# Patient Record
Sex: FEMALE | Race: WHITE | Employment: OTHER | URBAN - METROPOLITAN AREA
[De-identification: names, ages, dates, MRNs, and addresses within clinical notes are randomized per-mention and may not be internally consistent; named-entity substitution may affect disease eponyms.]

---

## 2017-01-10 ENCOUNTER — OFFICE VISIT (OUTPATIENT)
Dept: CARDIOLOGY CLINIC | Age: 47
End: 2017-01-10

## 2017-01-10 VITALS
WEIGHT: 122.6 LBS | HEART RATE: 76 BPM | SYSTOLIC BLOOD PRESSURE: 100 MMHG | BODY MASS INDEX: 21.72 KG/M2 | HEIGHT: 63 IN | DIASTOLIC BLOOD PRESSURE: 66 MMHG

## 2017-01-10 DIAGNOSIS — R06.02 SOB (SHORTNESS OF BREATH): Primary | ICD-10-CM

## 2017-01-10 DIAGNOSIS — R00.2 PALPITATIONS: ICD-10-CM

## 2017-01-10 DIAGNOSIS — M34.9 SCLERODERMA (HCC): ICD-10-CM

## 2017-01-10 PROCEDURE — 99214 OFFICE O/P EST MOD 30 MIN: CPT | Performed by: INTERNAL MEDICINE

## 2017-01-10 RX ORDER — MULTIVIT-MIN/IRON/FOLIC ACID/K 18-600-40
1 CAPSULE ORAL DAILY
Qty: 30 CAPSULE | Refills: 0 | Status: SHIPPED | OUTPATIENT
Start: 2017-01-10 | End: 2017-03-24 | Stop reason: SDUPTHER

## 2017-01-23 ENCOUNTER — TELEPHONE (OUTPATIENT)
Dept: CARDIOLOGY CLINIC | Age: 47
End: 2017-01-23

## 2017-01-30 PROBLEM — Z79.899 HIGH RISK MEDICATION USE: Status: ACTIVE | Noted: 2017-01-30

## 2017-01-31 ENCOUNTER — OFFICE VISIT (OUTPATIENT)
Dept: RHEUMATOLOGY | Age: 47
End: 2017-01-31

## 2017-01-31 ENCOUNTER — OFFICE VISIT (OUTPATIENT)
Dept: CARDIOLOGY CLINIC | Age: 47
End: 2017-01-31

## 2017-01-31 VITALS
HEART RATE: 68 BPM | SYSTOLIC BLOOD PRESSURE: 110 MMHG | WEIGHT: 121.4 LBS | BODY MASS INDEX: 21.51 KG/M2 | DIASTOLIC BLOOD PRESSURE: 60 MMHG | HEIGHT: 63 IN

## 2017-01-31 VITALS
WEIGHT: 120 LBS | DIASTOLIC BLOOD PRESSURE: 74 MMHG | BODY MASS INDEX: 21.26 KG/M2 | HEART RATE: 80 BPM | SYSTOLIC BLOOD PRESSURE: 100 MMHG

## 2017-01-31 DIAGNOSIS — I47.1 SVT (SUPRAVENTRICULAR TACHYCARDIA) (HCC): ICD-10-CM

## 2017-01-31 DIAGNOSIS — I27.20 PULMONARY HYPERTENSION (HCC): ICD-10-CM

## 2017-01-31 DIAGNOSIS — R21 RASH: ICD-10-CM

## 2017-01-31 DIAGNOSIS — R00.2 PALPITATIONS: Primary | ICD-10-CM

## 2017-01-31 DIAGNOSIS — M35.00 SICCA SYNDROME (HCC): ICD-10-CM

## 2017-01-31 DIAGNOSIS — Z79.899 HIGH RISK MEDICATION USE: ICD-10-CM

## 2017-01-31 DIAGNOSIS — M34.9 SCLERODERMA (HCC): Primary | ICD-10-CM

## 2017-01-31 PROBLEM — I47.10 SVT (SUPRAVENTRICULAR TACHYCARDIA): Status: ACTIVE | Noted: 2017-01-31

## 2017-01-31 LAB
A/G RATIO: 1.7 (ref 1.1–2.2)
ALBUMIN SERPL-MCNC: 4.5 G/DL (ref 3.4–5)
ALP BLD-CCNC: 39 U/L (ref 40–129)
ALT SERPL-CCNC: 17 U/L (ref 10–40)
ANION GAP SERPL CALCULATED.3IONS-SCNC: 13 MMOL/L (ref 3–16)
AST SERPL-CCNC: 21 U/L (ref 15–37)
BACTERIA: ABNORMAL /HPF
BASOPHILS ABSOLUTE: 0.1 K/UL (ref 0–0.2)
BASOPHILS RELATIVE PERCENT: 0.9 %
BILIRUB SERPL-MCNC: 0.4 MG/DL (ref 0–1)
BILIRUBIN URINE: ABNORMAL
BLOOD, URINE: NEGATIVE
BUN BLDV-MCNC: 12 MG/DL (ref 7–20)
CALCIUM SERPL-MCNC: 9.4 MG/DL (ref 8.3–10.6)
CHLORIDE BLD-SCNC: 103 MMOL/L (ref 99–110)
CLARITY: CLEAR
CO2: 22 MMOL/L (ref 21–32)
COLOR: ABNORMAL
CREAT SERPL-MCNC: 0.6 MG/DL (ref 0.6–1.1)
EOSINOPHILS ABSOLUTE: 0.2 K/UL (ref 0–0.6)
EOSINOPHILS RELATIVE PERCENT: 3.1 %
EPITHELIAL CELLS, UA: ABNORMAL /HPF
GFR AFRICAN AMERICAN: >60
GFR NON-AFRICAN AMERICAN: >60
GLOBULIN: 2.7 G/DL
GLUCOSE BLD-MCNC: 83 MG/DL (ref 70–99)
GLUCOSE URINE: NEGATIVE MG/DL
HCT VFR BLD CALC: 40 % (ref 36–48)
HEMOGLOBIN: 13.1 G/DL (ref 12–16)
KETONES, URINE: ABNORMAL MG/DL
LEUKOCYTE ESTERASE, URINE: NEGATIVE
LYMPHOCYTES ABSOLUTE: 2 K/UL (ref 1–5.1)
LYMPHOCYTES RELATIVE PERCENT: 28.1 %
MCH RBC QN AUTO: 29.8 PG (ref 26–34)
MCHC RBC AUTO-ENTMCNC: 32.7 G/DL (ref 31–36)
MCV RBC AUTO: 91.2 FL (ref 80–100)
MICROSCOPIC EXAMINATION: YES
MONOCYTES ABSOLUTE: 0.4 K/UL (ref 0–1.3)
MONOCYTES RELATIVE PERCENT: 5.6 %
MUCUS: ABNORMAL /LPF
NEUTROPHILS ABSOLUTE: 4.4 K/UL (ref 1.7–7.7)
NEUTROPHILS RELATIVE PERCENT: 62.3 %
NITRITE, URINE: NEGATIVE
PDW BLD-RTO: 14.1 % (ref 12.4–15.4)
PH UA: 5.5
PLATELET # BLD: 183 K/UL (ref 135–450)
PMV BLD AUTO: 12.4 FL (ref 5–10.5)
POTASSIUM SERPL-SCNC: 4.2 MMOL/L (ref 3.5–5.1)
PROTEIN UA: ABNORMAL MG/DL
RBC # BLD: 4.38 M/UL (ref 4–5.2)
RBC UA: ABNORMAL /HPF (ref 0–2)
SODIUM BLD-SCNC: 138 MMOL/L (ref 136–145)
SPECIFIC GRAVITY UA: 1.03
TOTAL PROTEIN: 7.2 G/DL (ref 6.4–8.2)
URINE TYPE: ABNORMAL
UROBILINOGEN, URINE: 0.2 E.U./DL
WBC # BLD: 7 K/UL (ref 4–11)
WBC UA: ABNORMAL /HPF (ref 0–5)

## 2017-01-31 PROCEDURE — G8427 DOCREV CUR MEDS BY ELIG CLIN: HCPCS | Performed by: INTERNAL MEDICINE

## 2017-01-31 PROCEDURE — 99214 OFFICE O/P EST MOD 30 MIN: CPT | Performed by: INTERNAL MEDICINE

## 2017-01-31 PROCEDURE — G8484 FLU IMMUNIZE NO ADMIN: HCPCS | Performed by: INTERNAL MEDICINE

## 2017-01-31 PROCEDURE — 1036F TOBACCO NON-USER: CPT | Performed by: INTERNAL MEDICINE

## 2017-01-31 PROCEDURE — G8420 CALC BMI NORM PARAMETERS: HCPCS | Performed by: INTERNAL MEDICINE

## 2017-01-31 PROCEDURE — 93000 ELECTROCARDIOGRAM COMPLETE: CPT | Performed by: INTERNAL MEDICINE

## 2017-01-31 RX ORDER — HYDROXYZINE HYDROCHLORIDE 25 MG/1
TABLET, FILM COATED ORAL
Qty: 30 TABLET | Refills: 1 | Status: SHIPPED | OUTPATIENT
Start: 2017-01-31 | End: 2017-03-24

## 2017-02-01 ENCOUNTER — TELEPHONE (OUTPATIENT)
Dept: DERMATOLOGY | Age: 47
End: 2017-02-01

## 2017-02-02 ENCOUNTER — OFFICE VISIT (OUTPATIENT)
Dept: DERMATOLOGY | Age: 47
End: 2017-02-02

## 2017-02-02 DIAGNOSIS — D48.5 NEOPLASM OF UNCERTAIN BEHAVIOR OF SKIN: ICD-10-CM

## 2017-02-02 DIAGNOSIS — L30.9 DERMATITIS: Primary | ICD-10-CM

## 2017-02-02 PROCEDURE — 99214 OFFICE O/P EST MOD 30 MIN: CPT | Performed by: DERMATOLOGY

## 2017-02-02 PROCEDURE — 11100 PR BIOPSY OF SKIN LESION: CPT | Performed by: DERMATOLOGY

## 2017-02-02 PROCEDURE — G8427 DOCREV CUR MEDS BY ELIG CLIN: HCPCS | Performed by: DERMATOLOGY

## 2017-02-02 PROCEDURE — 1036F TOBACCO NON-USER: CPT | Performed by: DERMATOLOGY

## 2017-02-02 PROCEDURE — G8420 CALC BMI NORM PARAMETERS: HCPCS | Performed by: DERMATOLOGY

## 2017-02-02 PROCEDURE — G8484 FLU IMMUNIZE NO ADMIN: HCPCS | Performed by: DERMATOLOGY

## 2017-02-02 RX ORDER — FLUOCINONIDE 0.5 MG/G
OINTMENT TOPICAL
Qty: 60 G | Refills: 0 | Status: SHIPPED | OUTPATIENT
Start: 2017-02-02 | End: 2018-08-09 | Stop reason: SDUPTHER

## 2017-02-07 ENCOUNTER — TELEPHONE (OUTPATIENT)
Dept: DERMATOLOGY | Age: 47
End: 2017-02-07

## 2017-03-16 ENCOUNTER — TELEPHONE (OUTPATIENT)
Dept: CARDIOLOGY CLINIC | Age: 47
End: 2017-03-16

## 2017-03-24 ENCOUNTER — OFFICE VISIT (OUTPATIENT)
Dept: CARDIOLOGY CLINIC | Age: 47
End: 2017-03-24

## 2017-03-24 VITALS
DIASTOLIC BLOOD PRESSURE: 78 MMHG | WEIGHT: 118 LBS | SYSTOLIC BLOOD PRESSURE: 118 MMHG | BODY MASS INDEX: 20.9 KG/M2 | HEART RATE: 64 BPM

## 2017-03-24 DIAGNOSIS — I47.1 SVT (SUPRAVENTRICULAR TACHYCARDIA) (HCC): Primary | ICD-10-CM

## 2017-03-24 DIAGNOSIS — I27.21 PAH (PULMONARY ARTERY HYPERTENSION) (HCC): ICD-10-CM

## 2017-03-24 DIAGNOSIS — I47.1 AVNRT (AV NODAL RE-ENTRY TACHYCARDIA) (HCC): ICD-10-CM

## 2017-03-24 PROCEDURE — 1036F TOBACCO NON-USER: CPT | Performed by: NURSE PRACTITIONER

## 2017-03-24 PROCEDURE — 93000 ELECTROCARDIOGRAM COMPLETE: CPT | Performed by: NURSE PRACTITIONER

## 2017-03-24 PROCEDURE — G8427 DOCREV CUR MEDS BY ELIG CLIN: HCPCS | Performed by: NURSE PRACTITIONER

## 2017-03-24 PROCEDURE — G8420 CALC BMI NORM PARAMETERS: HCPCS | Performed by: NURSE PRACTITIONER

## 2017-03-24 PROCEDURE — 99213 OFFICE O/P EST LOW 20 MIN: CPT | Performed by: NURSE PRACTITIONER

## 2017-03-24 PROCEDURE — G8484 FLU IMMUNIZE NO ADMIN: HCPCS | Performed by: NURSE PRACTITIONER

## 2017-04-10 PROCEDURE — 93224 XTRNL ECG REC UP TO 48 HRS: CPT | Performed by: INTERNAL MEDICINE

## 2017-04-11 ENCOUNTER — OFFICE VISIT (OUTPATIENT)
Dept: CARDIOLOGY CLINIC | Age: 47
End: 2017-04-11

## 2017-04-11 ENCOUNTER — HOSPITAL ENCOUNTER (OUTPATIENT)
Dept: CARDIOLOGY | Facility: CLINIC | Age: 47
Discharge: HOME | End: 2017-04-11
Attending: INTERNAL MEDICINE | Admitting: INTERNAL MEDICINE

## 2017-04-11 VITALS
HEART RATE: 72 BPM | WEIGHT: 115.4 LBS | SYSTOLIC BLOOD PRESSURE: 116 MMHG | BODY MASS INDEX: 20.45 KG/M2 | DIASTOLIC BLOOD PRESSURE: 60 MMHG | HEIGHT: 63 IN

## 2017-04-11 DIAGNOSIS — R06.02 SOB (SHORTNESS OF BREATH): ICD-10-CM

## 2017-04-11 DIAGNOSIS — Z01.810 PREOP CARDIOVASCULAR EXAM: ICD-10-CM

## 2017-04-11 DIAGNOSIS — I27.21 PAH (PULMONARY ARTERY HYPERTENSION) (HCC): Primary | ICD-10-CM

## 2017-04-11 DIAGNOSIS — R00.2 PALPITATIONS: ICD-10-CM

## 2017-04-11 LAB
LV EF: 55 %
LVEF MODALITY: NORMAL

## 2017-04-11 PROCEDURE — 99214 OFFICE O/P EST MOD 30 MIN: CPT | Performed by: INTERNAL MEDICINE

## 2017-04-11 PROCEDURE — G8427 DOCREV CUR MEDS BY ELIG CLIN: HCPCS | Performed by: INTERNAL MEDICINE

## 2017-04-11 PROCEDURE — 1036F TOBACCO NON-USER: CPT | Performed by: INTERNAL MEDICINE

## 2017-04-11 PROCEDURE — G8420 CALC BMI NORM PARAMETERS: HCPCS | Performed by: INTERNAL MEDICINE

## 2017-04-25 ENCOUNTER — TELEPHONE (OUTPATIENT)
Dept: CARDIOLOGY CLINIC | Age: 47
End: 2017-04-25

## 2017-04-27 ENCOUNTER — HOSPITAL ENCOUNTER (OUTPATIENT)
Dept: OTHER | Age: 47
Discharge: HOME | End: 2017-04-27
Attending: INTERNAL MEDICINE | Admitting: INTERNAL MEDICINE

## 2017-04-28 ENCOUNTER — OFFICE VISIT (OUTPATIENT)
Dept: ENDOCRINOLOGY | Age: 47
End: 2017-04-28

## 2017-04-28 VITALS
HEIGHT: 63 IN | DIASTOLIC BLOOD PRESSURE: 70 MMHG | SYSTOLIC BLOOD PRESSURE: 126 MMHG | WEIGHT: 112.2 LBS | HEART RATE: 81 BPM | BODY MASS INDEX: 19.88 KG/M2 | OXYGEN SATURATION: 98 %

## 2017-04-28 DIAGNOSIS — E24.2 IATROGENIC CUSHINGOID FEATURES (HCC): ICD-10-CM

## 2017-04-28 DIAGNOSIS — E55.9 VITAMIN D DEFICIENCY: ICD-10-CM

## 2017-04-28 DIAGNOSIS — E03.9 ACQUIRED HYPOTHYROIDISM: Primary | ICD-10-CM

## 2017-04-28 DIAGNOSIS — G62.9 NEUROPATHY: ICD-10-CM

## 2017-04-28 DIAGNOSIS — E11.40 TYPE 2 DIABETES MELLITUS WITH DIABETIC NEUROPATHY, WITHOUT LONG-TERM CURRENT USE OF INSULIN (HCC): ICD-10-CM

## 2017-04-28 LAB
A/G RATIO: 1.4 (ref 1.1–2.2)
ALBUMIN SERPL-MCNC: 4.4 G/DL (ref 3.4–5)
ALP BLD-CCNC: 44 U/L (ref 40–129)
ALT SERPL-CCNC: 29 U/L (ref 10–40)
ANION GAP SERPL CALCULATED.3IONS-SCNC: 14 MMOL/L (ref 3–16)
ANTI-THYROGLOB ABS: 129 IU/ML
AST SERPL-CCNC: 17 U/L (ref 15–37)
BILIRUB SERPL-MCNC: 0.5 MG/DL (ref 0–1)
BUN BLDV-MCNC: 11 MG/DL (ref 7–20)
CALCIUM SERPL-MCNC: 9.2 MG/DL (ref 8.3–10.6)
CHLORIDE BLD-SCNC: 101 MMOL/L (ref 99–110)
CO2: 23 MMOL/L (ref 21–32)
CREAT SERPL-MCNC: 0.5 MG/DL (ref 0.6–1.1)
ESTIMATED AVERAGE GLUCOSE: 105.4 MG/DL
GFR AFRICAN AMERICAN: >60
GFR NON-AFRICAN AMERICAN: >60
GLOBULIN: 3.1 G/DL
GLUCOSE BLD-MCNC: 92 MG/DL (ref 70–99)
HBA1C MFR BLD: 5.3 %
POTASSIUM SERPL-SCNC: 4.4 MMOL/L (ref 3.5–5.1)
SODIUM BLD-SCNC: 138 MMOL/L (ref 136–145)
THYROID PEROXIDASE (TPO) ABS: 422 IU/ML
TOTAL PROTEIN: 7.5 G/DL (ref 6.4–8.2)
TSH SERPL DL<=0.05 MIU/L-ACNC: 1.13 UIU/ML (ref 0.27–4.2)
VITAMIN D 25-HYDROXY: 29.4 NG/ML

## 2017-04-28 PROCEDURE — 3044F HG A1C LEVEL LT 7.0%: CPT | Performed by: INTERNAL MEDICINE

## 2017-04-28 PROCEDURE — 1036F TOBACCO NON-USER: CPT | Performed by: INTERNAL MEDICINE

## 2017-04-28 PROCEDURE — G8427 DOCREV CUR MEDS BY ELIG CLIN: HCPCS | Performed by: INTERNAL MEDICINE

## 2017-04-28 PROCEDURE — 99214 OFFICE O/P EST MOD 30 MIN: CPT | Performed by: INTERNAL MEDICINE

## 2017-04-28 PROCEDURE — G8420 CALC BMI NORM PARAMETERS: HCPCS | Performed by: INTERNAL MEDICINE

## 2017-04-28 RX ORDER — ERGOCALCIFEROL (VITAMIN D2) 1250 MCG
50000 CAPSULE ORAL WEEKLY
Qty: 12 CAPSULE | Refills: 1 | Status: SHIPPED | OUTPATIENT
Start: 2017-04-28 | End: 2017-07-24 | Stop reason: SDUPTHER

## 2017-04-28 ASSESSMENT — PATIENT HEALTH QUESTIONNAIRE - PHQ9
SUM OF ALL RESPONSES TO PHQ QUESTIONS 1-9: 0
1. LITTLE INTEREST OR PLEASURE IN DOING THINGS: 0
2. FEELING DOWN, DEPRESSED OR HOPELESS: 0
SUM OF ALL RESPONSES TO PHQ9 QUESTIONS 1 & 2: 0

## 2017-05-02 ENCOUNTER — OFFICE VISIT (OUTPATIENT)
Dept: RHEUMATOLOGY | Age: 47
End: 2017-05-02

## 2017-05-02 VITALS
BODY MASS INDEX: 20.19 KG/M2 | SYSTOLIC BLOOD PRESSURE: 100 MMHG | DIASTOLIC BLOOD PRESSURE: 80 MMHG | WEIGHT: 114 LBS | HEART RATE: 76 BPM

## 2017-05-02 DIAGNOSIS — G62.9 NEUROPATHY: ICD-10-CM

## 2017-05-02 DIAGNOSIS — I27.21 PAH (PULMONARY ARTERY HYPERTENSION) (HCC): ICD-10-CM

## 2017-05-02 DIAGNOSIS — M34.9 SCLERODERMA (HCC): Primary | ICD-10-CM

## 2017-05-02 DIAGNOSIS — M35.00 SICCA SYNDROME (HCC): ICD-10-CM

## 2017-05-02 DIAGNOSIS — Z79.899 HIGH RISK MEDICATION USE: ICD-10-CM

## 2017-05-02 PROCEDURE — G8427 DOCREV CUR MEDS BY ELIG CLIN: HCPCS | Performed by: INTERNAL MEDICINE

## 2017-05-02 PROCEDURE — 99214 OFFICE O/P EST MOD 30 MIN: CPT | Performed by: INTERNAL MEDICINE

## 2017-05-02 PROCEDURE — 1036F TOBACCO NON-USER: CPT | Performed by: INTERNAL MEDICINE

## 2017-05-02 PROCEDURE — G8420 CALC BMI NORM PARAMETERS: HCPCS | Performed by: INTERNAL MEDICINE

## 2017-05-02 RX ORDER — PREDNISONE 10 MG/1
TABLET ORAL
Qty: 21 TABLET | Refills: 0 | Status: SHIPPED | OUTPATIENT
Start: 2017-05-02 | End: 2017-05-12

## 2017-05-02 RX ORDER — AZATHIOPRINE 50 MG/1
TABLET ORAL
Qty: 30 TABLET | Refills: 2 | Status: SHIPPED | OUTPATIENT
Start: 2017-05-02 | End: 2017-11-07 | Stop reason: SDUPTHER

## 2017-05-09 ENCOUNTER — TELEPHONE (OUTPATIENT)
Dept: PULMONOLOGY | Age: 47
End: 2017-05-09

## 2017-05-23 ENCOUNTER — OFFICE VISIT (OUTPATIENT)
Dept: CARDIOLOGY CLINIC | Age: 47
End: 2017-05-23

## 2017-05-23 VITALS
BODY MASS INDEX: 20.2 KG/M2 | WEIGHT: 114 LBS | HEIGHT: 63 IN | HEART RATE: 70 BPM | SYSTOLIC BLOOD PRESSURE: 112 MMHG | DIASTOLIC BLOOD PRESSURE: 70 MMHG | OXYGEN SATURATION: 98 %

## 2017-05-23 DIAGNOSIS — I10 ESSENTIAL HYPERTENSION: ICD-10-CM

## 2017-05-23 DIAGNOSIS — I27.21 PAH (PULMONARY ARTERY HYPERTENSION) (HCC): ICD-10-CM

## 2017-05-23 DIAGNOSIS — I47.1 SVT (SUPRAVENTRICULAR TACHYCARDIA) (HCC): Primary | ICD-10-CM

## 2017-05-23 DIAGNOSIS — R00.2 PALPITATIONS: ICD-10-CM

## 2017-05-23 DIAGNOSIS — I47.1 AVNRT (AV NODAL RE-ENTRY TACHYCARDIA) (HCC): ICD-10-CM

## 2017-05-23 PROCEDURE — G8427 DOCREV CUR MEDS BY ELIG CLIN: HCPCS | Performed by: INTERNAL MEDICINE

## 2017-05-23 PROCEDURE — 99214 OFFICE O/P EST MOD 30 MIN: CPT | Performed by: INTERNAL MEDICINE

## 2017-05-23 PROCEDURE — 1036F TOBACCO NON-USER: CPT | Performed by: INTERNAL MEDICINE

## 2017-05-23 PROCEDURE — 93000 ELECTROCARDIOGRAM COMPLETE: CPT | Performed by: INTERNAL MEDICINE

## 2017-05-23 PROCEDURE — G8420 CALC BMI NORM PARAMETERS: HCPCS | Performed by: INTERNAL MEDICINE

## 2017-06-29 ENCOUNTER — PATIENT MESSAGE (OUTPATIENT)
Dept: ENDOCRINOLOGY | Age: 47
End: 2017-06-29

## 2017-07-11 ENCOUNTER — PATIENT MESSAGE (OUTPATIENT)
Dept: RHEUMATOLOGY | Age: 47
End: 2017-07-11

## 2017-07-11 DIAGNOSIS — M34.9 SCLERODERMA (HCC): Primary | ICD-10-CM

## 2017-07-11 DIAGNOSIS — M54.2 CERVICAL PAIN (NECK): ICD-10-CM

## 2017-07-19 ENCOUNTER — HOSPITAL ENCOUNTER (OUTPATIENT)
Dept: PHYSICAL THERAPY | Age: 47
Discharge: HOME | End: 2017-07-31

## 2017-07-19 ASSESSMENT — PAIN DESCRIPTION - FREQUENCY: FREQUENCY: CONTINUOUS

## 2017-07-19 ASSESSMENT — PAIN DESCRIPTION - DESCRIPTORS: DESCRIPTORS: ACHING

## 2017-07-19 ASSESSMENT — PAIN DESCRIPTION - ORIENTATION: ORIENTATION: RIGHT

## 2017-07-19 ASSESSMENT — PAIN SCALES - GENERAL: PAINLEVEL_OUTOF10: 7

## 2017-07-19 ASSESSMENT — PAIN DESCRIPTION - LOCATION: LOCATION: NECK

## 2017-07-19 ASSESSMENT — PAIN DESCRIPTION - PAIN TYPE: TYPE: ACUTE PAIN

## 2017-07-19 ASSESSMENT — PAIN DESCRIPTION - PROGRESSION: CLINICAL_PROGRESSION: GRADUALLY IMPROVING

## 2017-07-24 ENCOUNTER — HOSPITAL ENCOUNTER (OUTPATIENT)
Dept: PHYSICAL THERAPY | Age: 47
Discharge: HOME | End: 2017-07-24

## 2017-07-24 ENCOUNTER — OFFICE VISIT (OUTPATIENT)
Dept: DERMATOLOGY | Age: 47
End: 2017-07-24

## 2017-07-24 DIAGNOSIS — D48.5 NEOPLASM OF UNCERTAIN BEHAVIOR OF SKIN: ICD-10-CM

## 2017-07-24 DIAGNOSIS — D22.9 MULTIPLE BENIGN NEVI: Primary | ICD-10-CM

## 2017-07-24 DIAGNOSIS — Z85.828 HISTORY OF NONMELANOMA SKIN CANCER: ICD-10-CM

## 2017-07-24 DIAGNOSIS — L57.0 ACTINIC KERATOSIS: ICD-10-CM

## 2017-07-24 DIAGNOSIS — Z12.83 SCREENING EXAM FOR SKIN CANCER: ICD-10-CM

## 2017-07-24 PROCEDURE — 1036F TOBACCO NON-USER: CPT | Performed by: DERMATOLOGY

## 2017-07-24 PROCEDURE — G8420 CALC BMI NORM PARAMETERS: HCPCS | Performed by: DERMATOLOGY

## 2017-07-24 PROCEDURE — G8427 DOCREV CUR MEDS BY ELIG CLIN: HCPCS | Performed by: DERMATOLOGY

## 2017-07-24 PROCEDURE — 17000 DESTRUCT PREMALG LESION: CPT | Performed by: DERMATOLOGY

## 2017-07-24 PROCEDURE — 11100 PR BIOPSY OF SKIN LESION: CPT | Performed by: DERMATOLOGY

## 2017-07-24 PROCEDURE — 99213 OFFICE O/P EST LOW 20 MIN: CPT | Performed by: DERMATOLOGY

## 2017-07-28 ENCOUNTER — TELEPHONE (OUTPATIENT)
Dept: DERMATOLOGY | Age: 47
End: 2017-07-28

## 2017-07-28 ENCOUNTER — HOSPITAL ENCOUNTER (OUTPATIENT)
Dept: PHYSICAL THERAPY | Age: 47
Discharge: HOME | End: 2017-07-28

## 2017-07-31 ENCOUNTER — HOSPITAL ENCOUNTER (OUTPATIENT)
Dept: PHYSICAL THERAPY | Age: 47
Discharge: HOME | End: 2017-07-31

## 2017-08-04 ENCOUNTER — HOSPITAL ENCOUNTER (OUTPATIENT)
Dept: PHYSICAL THERAPY | Age: 47
Discharge: HOME | End: 2017-08-04

## 2017-08-07 ENCOUNTER — HOSPITAL ENCOUNTER (OUTPATIENT)
Dept: PHYSICAL THERAPY | Age: 47
Discharge: HOME | End: 2017-08-07

## 2017-08-11 ENCOUNTER — HOSPITAL ENCOUNTER (OUTPATIENT)
Dept: PHYSICAL THERAPY | Age: 47
Discharge: HOME | End: 2017-08-11

## 2017-08-21 ENCOUNTER — OFFICE VISIT (OUTPATIENT)
Dept: PULMONOLOGY | Age: 47
End: 2017-08-21

## 2017-08-21 VITALS
HEIGHT: 63 IN | SYSTOLIC BLOOD PRESSURE: 102 MMHG | HEART RATE: 64 BPM | RESPIRATION RATE: 16 BRPM | OXYGEN SATURATION: 98 % | DIASTOLIC BLOOD PRESSURE: 62 MMHG | BODY MASS INDEX: 20.66 KG/M2 | TEMPERATURE: 98.6 F | WEIGHT: 116.6 LBS

## 2017-08-21 DIAGNOSIS — M34.9 SCLERODERMA (HCC): Primary | ICD-10-CM

## 2017-08-21 DIAGNOSIS — R06.02 SOB (SHORTNESS OF BREATH): ICD-10-CM

## 2017-08-21 PROCEDURE — G8427 DOCREV CUR MEDS BY ELIG CLIN: HCPCS | Performed by: INTERNAL MEDICINE

## 2017-08-21 PROCEDURE — 1036F TOBACCO NON-USER: CPT | Performed by: INTERNAL MEDICINE

## 2017-08-21 PROCEDURE — G8420 CALC BMI NORM PARAMETERS: HCPCS | Performed by: INTERNAL MEDICINE

## 2017-08-21 PROCEDURE — 99213 OFFICE O/P EST LOW 20 MIN: CPT | Performed by: INTERNAL MEDICINE

## 2017-08-24 ENCOUNTER — HOSPITAL ENCOUNTER (OUTPATIENT)
Dept: PHYSICAL THERAPY | Age: 47
Discharge: HOME | End: 2017-08-24

## 2017-09-05 ENCOUNTER — TELEPHONE (OUTPATIENT)
Dept: INTERNAL MEDICINE CLINIC | Age: 47
End: 2017-09-05

## 2017-09-05 PROBLEM — M54.2 CERVICAL PAIN (NECK): Status: ACTIVE | Noted: 2017-09-05

## 2017-09-07 ENCOUNTER — OFFICE VISIT (OUTPATIENT)
Dept: RHEUMATOLOGY | Age: 47
End: 2017-09-07

## 2017-09-07 VITALS
DIASTOLIC BLOOD PRESSURE: 74 MMHG | SYSTOLIC BLOOD PRESSURE: 130 MMHG | BODY MASS INDEX: 19.91 KG/M2 | HEART RATE: 80 BPM | WEIGHT: 112.4 LBS

## 2017-09-07 DIAGNOSIS — I27.21 PAH (PULMONARY ARTERY HYPERTENSION) (HCC): ICD-10-CM

## 2017-09-07 DIAGNOSIS — M54.2 CERVICAL PAIN (NECK): ICD-10-CM

## 2017-09-07 DIAGNOSIS — M35.00 SICCA SYNDROME (HCC): ICD-10-CM

## 2017-09-07 DIAGNOSIS — R00.2 PALPITATIONS: ICD-10-CM

## 2017-09-07 DIAGNOSIS — M34.9 SCLERODERMA (HCC): Primary | ICD-10-CM

## 2017-09-07 DIAGNOSIS — Z79.899 HIGH RISK MEDICATION USE: ICD-10-CM

## 2017-09-07 LAB
ALBUMIN SERPL-MCNC: 5 G/DL (ref 3.4–5)
ALP BLD-CCNC: 44 U/L (ref 40–129)
ALT SERPL-CCNC: 42 U/L (ref 10–40)
AST SERPL-CCNC: 37 U/L (ref 15–37)
BASOPHILS ABSOLUTE: 0 K/UL (ref 0–0.2)
BASOPHILS RELATIVE PERCENT: 0.2 %
BILIRUB SERPL-MCNC: 0.4 MG/DL (ref 0–1)
BILIRUBIN DIRECT: <0.2 MG/DL (ref 0–0.3)
BILIRUBIN, INDIRECT: ABNORMAL MG/DL (ref 0–1)
C-REACTIVE PROTEIN: 0.8 MG/L (ref 0–5.1)
CREAT SERPL-MCNC: 0.6 MG/DL (ref 0.6–1.1)
EOSINOPHILS ABSOLUTE: 0.1 K/UL (ref 0–0.6)
EOSINOPHILS RELATIVE PERCENT: 0.4 %
GFR AFRICAN AMERICAN: >60
GFR NON-AFRICAN AMERICAN: >60
HCT VFR BLD CALC: 41.6 % (ref 36–48)
HEMOGLOBIN: 13.5 G/DL (ref 12–16)
LYMPHOCYTES ABSOLUTE: 1 K/UL (ref 1–5.1)
LYMPHOCYTES RELATIVE PERCENT: 7.3 %
MCH RBC QN AUTO: 29 PG (ref 26–34)
MCHC RBC AUTO-ENTMCNC: 32.6 G/DL (ref 31–36)
MCV RBC AUTO: 89 FL (ref 80–100)
MONOCYTES ABSOLUTE: 0.6 K/UL (ref 0–1.3)
MONOCYTES RELATIVE PERCENT: 4.2 %
NEUTROPHILS ABSOLUTE: 12 K/UL (ref 1.7–7.7)
NEUTROPHILS RELATIVE PERCENT: 87.9 %
PDW BLD-RTO: 15.3 % (ref 12.4–15.4)
PLATELET # BLD: 234 K/UL (ref 135–450)
PMV BLD AUTO: 12.7 FL (ref 5–10.5)
RBC # BLD: 4.67 M/UL (ref 4–5.2)
TOTAL PROTEIN: 8.3 G/DL (ref 6.4–8.2)
WBC # BLD: 13.6 K/UL (ref 4–11)

## 2017-09-07 PROCEDURE — G8427 DOCREV CUR MEDS BY ELIG CLIN: HCPCS | Performed by: INTERNAL MEDICINE

## 2017-09-07 PROCEDURE — 99214 OFFICE O/P EST MOD 30 MIN: CPT | Performed by: INTERNAL MEDICINE

## 2017-09-07 PROCEDURE — G8420 CALC BMI NORM PARAMETERS: HCPCS | Performed by: INTERNAL MEDICINE

## 2017-09-07 PROCEDURE — 1036F TOBACCO NON-USER: CPT | Performed by: INTERNAL MEDICINE

## 2017-09-07 RX ORDER — PREDNISONE 10 MG/1
TABLET ORAL
Qty: 21 TABLET | Refills: 0 | Status: SHIPPED | OUTPATIENT
Start: 2017-09-07 | End: 2017-09-17

## 2017-10-17 ENCOUNTER — TELEPHONE (OUTPATIENT)
Dept: ENDOCRINOLOGY | Age: 47
End: 2017-10-17

## 2017-10-23 ENCOUNTER — OFFICE VISIT (OUTPATIENT)
Dept: CARDIOLOGY CLINIC | Age: 47
End: 2017-10-23

## 2017-10-23 VITALS
DIASTOLIC BLOOD PRESSURE: 62 MMHG | HEIGHT: 63 IN | BODY MASS INDEX: 19.84 KG/M2 | SYSTOLIC BLOOD PRESSURE: 114 MMHG | HEART RATE: 68 BPM | WEIGHT: 112 LBS

## 2017-10-23 DIAGNOSIS — M34.9 SCLERODERMA (HCC): ICD-10-CM

## 2017-10-23 DIAGNOSIS — R00.2 PALPITATIONS: ICD-10-CM

## 2017-10-23 DIAGNOSIS — I27.21 PAH (PULMONARY ARTERY HYPERTENSION) (HCC): Primary | ICD-10-CM

## 2017-10-23 DIAGNOSIS — R06.02 SOB (SHORTNESS OF BREATH): ICD-10-CM

## 2017-10-23 PROCEDURE — G8484 FLU IMMUNIZE NO ADMIN: HCPCS | Performed by: INTERNAL MEDICINE

## 2017-10-23 PROCEDURE — G8427 DOCREV CUR MEDS BY ELIG CLIN: HCPCS | Performed by: INTERNAL MEDICINE

## 2017-10-23 PROCEDURE — 99214 OFFICE O/P EST MOD 30 MIN: CPT | Performed by: INTERNAL MEDICINE

## 2017-10-23 PROCEDURE — 1036F TOBACCO NON-USER: CPT | Performed by: INTERNAL MEDICINE

## 2017-10-23 PROCEDURE — G8420 CALC BMI NORM PARAMETERS: HCPCS | Performed by: INTERNAL MEDICINE

## 2017-10-23 NOTE — LETTER
[address] [place] [address] Cardiology - [address] [place] [address]  Phone: [phone]  Fax: [phone]    Prince Bean MD        October 26, 2017     Debbe Ponds  [place] [id number] [facility] [id number]    Patient: Grecia Pedersen  MR Number: [id number]  YOB: 1970  Date of Visit: 10/23/2017    Dear Dr. Jonathon Jones:    Thank you for the request for consultation for Vanessa Mir to me for the evaluation of PAH. Below are the relevant portions of my assessment and plan of care. Assessment:      Problem List Items Addressed This Visit      Scleroderma ([address]     Palpitations     SOB (shortness of breath)     PAH (pulmonary artery hypertension) - Primary       Other Visit Diagnoses    None.               Plan:    1. Continue current medications. 2. Will obtain chest xray to evaluate left side pain  3. Check blood work as ordered, CMP, BNP, lipids   4. Follow up with me in 6 months with echo  5. Could maybe try nifedipine for her cold fingers and toes    If you have questions, please do not hesitate to call me. I look forward to following Alice Anne along with you.     Sincerely,        Prince Bean MD

## 2017-10-23 NOTE — PROGRESS NOTES
[address]   Advanced Heart Failure/Pulmonary Hypertension  Cardiac Evaluation      [facility]  YOB: 1970    Date of Visit:  10/23/17      Chief Complaint   Patient presents with    6 Month Follow-Up    Chest Pain    Shortness of Breath        History of Present Illness: This 56 y.o. female is seen at the request of Dr. Brando Haywood for consultation of [address]. She was seen by Dr. Pablo Wilcox in the past for her [address] but wants to switch to me. She has a complex history of PAH, Scleroderma, Alpha 1 antitrypsin deficiency, Raynauds and arthritis. She had thyroid dysfunction and diabetes with her pregnancy. She also has cervical stenosis. She is a mother of 11 children youngest one is 3year old. She sees Dr. Lisa Feliciano yearly for scleroderma and gets yearly CT scans. She sees endocrinologist for thyroid problems and diabetes. Was having ptosis on her eye with double vision and was started on high dose prednisone. She notices a little shortness of breath if she starts to move faster and her heart rate starts to race that is getting progressively worse in frequency and severity. She wore a 24 hour monitor but no racing heart beats at the time she was wearing it. She usually wait it out rest and it resolves after several hours. She did call the [facility]. She was seen by EPO years ago and has tried a medication name unknown to slow the HR but her BP went too low and the medication was stopped. Family history includes GM with heart disease at age [de-identified]. Her other GM has a pacemaker. Her breathing has been okay. She underwent R & LHC on 6/1/2016 that revealed no CAD and no PHTN. She has an ablation scheduled on 8/8/2016 for the SVT and she was postponing until the end of summer due to inability to swim after procedure. She does report some occasional chest pain that she reports with palpation and some deep pain that is similar to pleurisy.       On 1/10/017 she reported she had increase SOB and left sided chest pain under her breast to her left axilla with exercise or over exertion. She reported the pain was sharp and worse with deep inspiration. She reported the sharp pain started around April 2016. She reported she has occasional palpitations which was ongoing. She also had occasional BLE edema which was unchanged. She reported the palpitations resolved with vagal maneuvers. She has been following with Dr Wyatt Portillo for scleroderma. She underwent an SVT ablation on 3/15/17. She wore a 48 hour holter monitor. Her preliminary echo from 4/11/17 shows normal function. She is here for follow up for PAH, SOB, and palpitations. Today she reports she has been feeling well. She states she has SOB when she exercises's  She has a pain on her left side around her rib area when she is active. This occurs when she takes a deep breath. She follows with DR. Wyatt Portillo. She still has palpitations at times that have improved. She denies any dizziness, edema or syncope. She states her fingers are cold all of the time.      Allergies   Allergen Reactions    Cymbalta [Duloxetine Hcl]      Swelling of face    Spectracef [Cefditoren Pivoxil] Shortness Of Breath, Itching and Swelling    Bactrim Rash    Cephalosporins     Other      TREE NUTS  APPLES    Proventil [Albuterol]     Robaxin [Methocarbamol]     Tramadol Itching     Mouth Itch    Ciprofloxacin Swelling and Rash    Dilaudid [Hydromorphone] Nausea And Vomiting     Current Outpatient Prescriptions   Medication Sig Dispense Refill    levothyroxine (SYNTHROID) 75 MCG tablet TAKE ONE TABLET BY MOUTH DAILY 30 tablet 1    azaTHIOprine (IMURAN) 50 MG tablet 1/2 pill once a day 30 tablet 2    ondansetron (ZOFRAN-ODT) 4 MG disintegrating tablet Take 4 mg by mouth every 8 hours as needed for Nausea or Vomiting      fluticasone (FLONASE) 50 MCG/ACT nasal spray 1 spray by Nasal route daily      fluocinonide (LIDEX) 0.05 % ointment Apply sparingly to affected itchy flared area(s) up to bid prn 60 g 0    hydroxychloroquine (PLAQUENIL) 200 MG tablet TAKE ONE TABLET BY MOUTH TWICE A DAY 60 tablet 11    pyridostigmine (MESTINON) 60 MG tablet Take 60 mg by mouth daily       Alpha Lipoic Acid 200 MG CAPS Take 200 mg by mouth 2 times daily 180 capsule 1    omeprazole (PRILOSEC) 20 MG capsule Take 20 mg by mouth daily      gabapentin (NEURONTIN) 100 MG capsule Take 300 mg by mouth 3 times daily       UNABLE TO FIND Bio med #78      lidocaine (LIDODERM) 5 % Place 1 patch onto the skin daily. 12 hours on, 12 hours off. 30 patch 0    diclofenac sodium 1 % GEL Apply 2 g topically 4 times daily as needed. 1 Tube 5    calcium carbonate-vitamin D (CALTRATE 600+D) 600-400 MG-UNIT TABS Take  by mouth.  aspirin 81 MG EC tablet Take 81 mg by mouth daily.  Vitamin D (CHOLECALCIFEROL) 1000 UNIT CAPS capsule Take 2,000 Units by mouth daily        No current facility-administered medications for this visit. Past Medical History:   Diagnosis Date    Alpha-1-antitrypsin deficiency ([facility] [address] [facility]     Blood circulation, collateral rynaud's disease    Cardiac arrhythmia     Clostridium difficile diarrhea     DNA probe positive 8/7/11    Colitis 7/2011    Salmonela positive    Diarrhea     Fibromyalgia     GERD (gastroesophageal reflux disease)     irritable bowel    Hashimoto thyroiditis     History of recurrent miscarriages     3    Incompetent cervix     3 cervical procedures.  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 [address] [facility] 2008    Thyroid dysfunction in pregnancy     Thyroiditis     Postpartum, resolved    Type II diabetes mellitus with neurological manifestations [facility])      Past Surgical History:   Procedure Laterality Date    ABLATION OF DYSRHYTHMIC FOCUS  03/2017    COLONOSCOPY  7/29/2011    CYSTOSCOPY  7/2011    DIAGNOSTIC CARDIAC CATH LAB PROCEDURE      DILATION AND CURETTAGE OF UTERUS      MOHS SURGERY Right 10/2015    VASC UPPER EXTREMITY VENOUS  UNI  01/2017    Left lower Leg    VENTRICULAR ABLATION SURGERY      VESICOVAGINAL FISTULA REPAIR  04/2017     Family History   Problem Relation Age of Onset    Cancer Maternal Grandfather      colon cancer    Thyroid Disease Mother     Diabetes Maternal Grandmother     Heart Disease Maternal Grandmother     Thyroid Disease Maternal Grandmother     Cancer Maternal Grandmother      melanoma    Other Daughter      connective tissues disease    Cancer Maternal Uncle      melanoma    High Cholesterol Neg Hx     High Blood Pressure Neg Hx      Social History     Social History    Marital status:      Spouse name: N/A    Number of children: N/A    Years of education: N/A     Occupational History    Not on file. Social History Main Topics    Smoking status: Never Smoker    Smokeless tobacco: Never Used    Alcohol use No    Drug use: No    Sexual activity: Not on file     Other Topics Concern    Not on file     Social History Narrative    No narrative on file       Review of Systems:   · Constitutional: there has been no unanticipated weight loss. There's been no change in energy level, sleep pattern, or activity level. · Eyes: No visual changes or diplopia. No scleral icterus. · ENT: No Headaches, hearing loss or vertigo. No mouth sores or sore throat. · Cardiovascular: Reviewed in HPI  · Respiratory: No cough or wheezing, no sputum production. No hematemesis. · Gastrointestinal: No abdominal pain, appetite loss, blood in stools. No change in bowel or bladder habits. · Genitourinary: No dysuria, trouble voiding, or hematuria.   · Musculoskeletal:  No gait disturbance, weakness or joint complaints. · Integumentary: No rash or pruritis. · Neurological: No headache, diplopia, change in muscle strength, numbness or tingling. No change in gait, balance, coordination, mood, affect, memory, mentation, behavior. · Psychiatric: No anxiety, no depression. · Endocrine: No malaise, fatigue or temperature intolerance. No excessive thirst, fluid intake, or urination. No tremor. · Hematologic/Lymphatic: No abnormal bruising or bleeding, blood clots or swollen lymph nodes. · Allergic/Immunologic: No nasal congestion or hives. Physical Examination:    Vitals:    10/23/17 0732   BP: 114/62   Pulse: 68   Weight: 112 lb (50.8 kg)   Height: 5' 3\" (1.6 m)     Body mass index is 19.84 kg/m². Wt Readings from Last 3 Encounters:   10/23/17 112 lb (50.8 kg)   09/07/17 112 lb 6.4 oz (51 kg)   08/21/17 116 lb 9.6 oz (52.9 kg)     BP Readings from Last 3 Encounters:   10/23/17 114/62   09/07/17 130/74   08/21/17 102/62     Constitutional and General Appearance:   WD/WN in NAD  HEENT:  NC/AT  Skin:  Warm, dry  Respiratory:  · Normal excursion and expansion without use of accessory muscles  · Resp Auscultation: Normal breath sounds without dullness  Cardiovascular:  · The apical impulses not displaced  · Heart tones are crisp and normal  · Cervical veins are not engorged  · The carotid upstroke is normal in amplitude and contour without delay or bruit  · JVP less than 8 cm H2O  RRR with nl S1 and S2 without m,r,g  · Peripheral pulses are symmetrical and full  · There is no clubbing, cyanosis of the extremities.   · No edema  · Femoral Arteries: 2+ and equal  · Pedal Pulses: 2+ and equal   Neck:  · JVP less than 8 cm H2O  · No thyromegaly  Abdomen:  · No masses or tenderness  · Liver/Spleen: No Abnormalities Noted  Neurological/Psychiatric:  · Alert and oriented in all spheres  · Moves all extremities well  · Exhibits normal gait balance and coordination  · No abnormalities of mood, affect, memory, mentation, or behavior are noted      Echo 4/11/17  Summary   Normal left ventricle systolic function with an estimated ejection fraction   of 55%. No regional wall motion abnormalities seen. Normal left ventricular diastolic filling pressure. Systolic pulmonary artery pressure (SPAP) is normal and estimated at 24 mmHg   (RA pressure 3 mmHg). CATH: 6/1/2016  PA 18/6 TZ-by echo RVSP 47-4/16  ~Findings  Normal pulmonary pressures and outputs  Normal coronary arteries   Recommendation  ~Aggressive medical treatment and risk factor modification      GXT Myoview-4/19/2016  Normal LV size and systolic function. Left ventricular ejection fraction of   70 %. Normal wall motion. There is a small anterolateral defect that is   present at rest and worse at stress concerning for ischemia. However cannot   rule out artifact from breast attenuation and increased bowel uptake of   radio tracer. ECHO: 4/19/2016  Normal left ventricle size,wall thickness and systolic function with an  estimated ejection fraction of 55-60%. No regional wall abnormalites are seen. Normal diastolic filling pattern for age. Mitral valve is structurally normal. No mitral stenosis. Mild Mitral regurgitation. The aortic valve is structurally normal.No aortic stenosis. Trace aortic regurgitation. Tricuspid valve is structurally normal.  Mild tricuspid regurgitation. Systolic pulmonary artery pressure (SPAP) is estimated at 47 mmHg consistent  with mild pulmonary hypertension (RA pressure 8 mmHg). IVC is normal in size (< 2.1 cm) and collapses < 50% with respiration  consistent with elevated RA pressure (8 mmHg). no intracardiac mass, vegetations or thrombi. Assessment:  Problem List Items Addressed This Visit     Scleroderma ([address]    Palpitations    SOB (shortness of breath)    PAH (pulmonary artery hypertension) - Primary      Other Visit Diagnoses    None. Plan:    1. Continue current medications.    2. Will obtain chest xray to evaluate left side pain  3. Check blood work as ordered, CMP, BNP, lipids   4. Follow up with me in 6 months with echo  5. Could maybe try nifedipine for her cold fingers and toes      QUALITY MEASURES  1. Tobacco Cessation Counseling: NA  2. Retake of BP if >140/90:   NA  3. Documentation to PCP/referring for new patient:  Sent to PCP at close of office visit  4. CAD patient on anti-platelet: NA  5. CAD patient on STATIN therapy:  NA  6. Patient with CHF and aFib on anticoagulation:  NA       I appreciate the opportunity of cooperating in the care of this patient.     John Evangelista M.D., [facility] [place]

## 2017-10-27 ENCOUNTER — HOSPITAL ENCOUNTER (OUTPATIENT)
Dept: OTHER | Age: 47
Discharge: HOME | End: 2017-10-27
Attending: INTERNAL MEDICINE | Admitting: INTERNAL MEDICINE

## 2017-10-27 DIAGNOSIS — R06.02 SOB (SHORTNESS OF BREATH): ICD-10-CM

## 2017-10-27 DIAGNOSIS — E24.2 IATROGENIC CUSHINGOID FEATURES (HCC): ICD-10-CM

## 2017-10-27 DIAGNOSIS — E03.9 ACQUIRED HYPOTHYROIDISM: ICD-10-CM

## 2017-10-27 DIAGNOSIS — G62.9 NEUROPATHY: ICD-10-CM

## 2017-10-27 DIAGNOSIS — E11.40 TYPE 2 DIABETES MELLITUS WITH DIABETIC NEUROPATHY, WITHOUT LONG-TERM CURRENT USE OF INSULIN (HCC): ICD-10-CM

## 2017-10-27 DIAGNOSIS — E55.9 VITAMIN D DEFICIENCY: ICD-10-CM

## 2017-10-27 DIAGNOSIS — I27.21 PAH (PULMONARY ARTERY HYPERTENSION) (HCC): ICD-10-CM

## 2017-10-27 LAB
A/G RATIO: 1.3 (ref 1.1–2.2)
ALBUMIN SERPL-MCNC: 4.4 G/DL (ref 3.4–5)
ALP BLD-CCNC: 42 U/L (ref 40–129)
ALT SERPL-CCNC: 18 U/L (ref 10–40)
ANION GAP SERPL CALCULATED.3IONS-SCNC: 15 MMOL/L (ref 3–16)
AST SERPL-CCNC: 22 U/L (ref 15–37)
BILIRUB SERPL-MCNC: 0.6 MG/DL (ref 0–1)
BUN BLDV-MCNC: 10 MG/DL (ref 7–20)
CALCIUM SERPL-MCNC: 9.3 MG/DL (ref 8.3–10.6)
CHLORIDE BLD-SCNC: 102 MMOL/L (ref 99–110)
CHOLESTEROL, TOTAL: 165 MG/DL (ref 0–199)
CO2: 23 MMOL/L (ref 21–32)
CREAT SERPL-MCNC: 0.6 MG/DL (ref 0.6–1.1)
ESTIMATED AVERAGE GLUCOSE: 116.9 MG/DL
GFR AFRICAN AMERICAN: >60
GFR NON-AFRICAN AMERICAN: >60
GLOBULIN: 3.3 G/DL
GLUCOSE BLD-MCNC: 82 MG/DL (ref 70–99)
HBA1C MFR BLD: 5.7 %
HDLC SERPL-MCNC: 71 MG/DL (ref 40–60)
LDL CHOLESTEROL CALCULATED: 86 MG/DL
POTASSIUM SERPL-SCNC: 4 MMOL/L (ref 3.5–5.1)
PRO-BNP: 45 PG/ML (ref 0–124)
SODIUM BLD-SCNC: 140 MMOL/L (ref 136–145)
T4 FREE: 1.4 NG/DL (ref 0.9–1.8)
TOTAL PROTEIN: 7.7 G/DL (ref 6.4–8.2)
TRIGL SERPL-MCNC: 40 MG/DL (ref 0–150)
TSH SERPL DL<=0.05 MIU/L-ACNC: 1.58 UIU/ML (ref 0.27–4.2)
VITAMIN D 25-HYDROXY: 31.9 NG/ML
VLDLC SERPL CALC-MCNC: 8 MG/DL

## 2017-11-06 ENCOUNTER — TELEPHONE (OUTPATIENT)
Dept: CARDIOLOGY CLINIC | Age: 47
End: 2017-11-06

## 2017-11-07 DIAGNOSIS — M34.9 SCLERODERMA (HCC): ICD-10-CM

## 2017-11-07 RX ORDER — AZATHIOPRINE 50 MG/1
TABLET ORAL
Qty: 15 TABLET | Refills: 2 | Status: SHIPPED | OUTPATIENT
Start: 2017-11-07 | End: 2018-02-04 | Stop reason: SDUPTHER

## 2017-12-15 RX ORDER — HYDROXYCHLOROQUINE SULFATE 200 MG/1
TABLET, FILM COATED ORAL
Qty: 60 TABLET | Refills: 10 | Status: SHIPPED | OUTPATIENT
Start: 2017-12-15 | End: 2018-12-23 | Stop reason: SDUPTHER

## 2018-02-04 DIAGNOSIS — M34.9 SCLERODERMA (HCC): ICD-10-CM

## 2018-02-05 RX ORDER — AZATHIOPRINE 50 MG/1
TABLET ORAL
Qty: 15 TABLET | Refills: 2 | Status: SHIPPED | OUTPATIENT
Start: 2018-02-05 | End: 2018-08-09

## 2018-04-10 ENCOUNTER — OFFICE VISIT (OUTPATIENT)
Dept: RHEUMATOLOGY | Age: 48
End: 2018-04-10

## 2018-04-10 VITALS
WEIGHT: 114 LBS | BODY MASS INDEX: 20.19 KG/M2 | HEART RATE: 70 BPM | DIASTOLIC BLOOD PRESSURE: 72 MMHG | SYSTOLIC BLOOD PRESSURE: 110 MMHG

## 2018-04-10 DIAGNOSIS — Z79.899 HIGH RISK MEDICATION USE: ICD-10-CM

## 2018-04-10 DIAGNOSIS — M35.00 SICCA SYNDROME (HCC): ICD-10-CM

## 2018-04-10 DIAGNOSIS — M34.9 SCLERODERMA (HCC): Primary | ICD-10-CM

## 2018-04-10 PROCEDURE — 93224 XTRNL ECG REC UP TO 48 HRS: CPT | Performed by: INTERNAL MEDICINE

## 2018-04-10 PROCEDURE — G8420 CALC BMI NORM PARAMETERS: HCPCS | Performed by: INTERNAL MEDICINE

## 2018-04-10 PROCEDURE — G8427 DOCREV CUR MEDS BY ELIG CLIN: HCPCS | Performed by: INTERNAL MEDICINE

## 2018-04-10 PROCEDURE — 1036F TOBACCO NON-USER: CPT | Performed by: INTERNAL MEDICINE

## 2018-04-10 PROCEDURE — 99214 OFFICE O/P EST MOD 30 MIN: CPT | Performed by: INTERNAL MEDICINE

## 2018-04-10 RX ORDER — PROMETHAZINE HYDROCHLORIDE 12.5 MG/1
TABLET ORAL
COMMUNITY
Start: 2018-01-29

## 2018-04-10 RX ORDER — CHOLECALCIFEROL (VITAMIN D3) 1250 MCG
1 CAPSULE ORAL
COMMUNITY
End: 2019-07-02

## 2018-04-11 ENCOUNTER — HOSPITAL ENCOUNTER (OUTPATIENT)
Dept: OTHER | Age: 48
Discharge: HOME | End: 2018-04-11
Attending: INTERNAL MEDICINE | Admitting: INTERNAL MEDICINE

## 2018-04-11 ENCOUNTER — TELEPHONE (OUTPATIENT)
Dept: CARDIOLOGY CLINIC | Age: 48
End: 2018-04-11

## 2018-04-11 DIAGNOSIS — R35.0 URINE FREQUENCY: Primary | ICD-10-CM

## 2018-04-11 LAB
ALBUMIN SERPL-MCNC: 4.6 G/DL (ref 3.4–5)
ALP BLD-CCNC: 41 U/L (ref 40–129)
ALT SERPL-CCNC: 25 U/L (ref 10–40)
AMYLASE: 81 U/L (ref 25–115)
AST SERPL-CCNC: 24 U/L (ref 15–37)
BACTERIA: ABNORMAL /HPF
BILIRUB SERPL-MCNC: 0.3 MG/DL (ref 0–1)
BILIRUBIN DIRECT: <0.2 MG/DL (ref 0–0.3)
BILIRUBIN URINE: NEGATIVE
BILIRUBIN, INDIRECT: NORMAL MG/DL (ref 0–1)
BLOOD, URINE: NEGATIVE
C-REACTIVE PROTEIN: 0.7 MG/L (ref 0–5.1)
CLARITY: ABNORMAL
COLOR: YELLOW
CREAT SERPL-MCNC: 0.5 MG/DL (ref 0.6–1.1)
EPITHELIAL CELLS, UA: ABNORMAL /HPF
GFR AFRICAN AMERICAN: >60
GFR NON-AFRICAN AMERICAN: >60
GLUCOSE URINE: NEGATIVE MG/DL
HCT VFR BLD CALC: 38.1 % (ref 36–48)
HEMOGLOBIN: 12.8 G/DL (ref 12–16)
KETONES, URINE: ABNORMAL MG/DL
LEUKOCYTE ESTERASE, URINE: ABNORMAL
LIPASE: 49 U/L (ref 13–60)
MCH RBC QN AUTO: 28.8 PG (ref 26–34)
MCHC RBC AUTO-ENTMCNC: 33.6 G/DL (ref 31–36)
MCV RBC AUTO: 85.9 FL (ref 80–100)
MICROSCOPIC EXAMINATION: YES
NITRITE, URINE: NEGATIVE
PDW BLD-RTO: 14.9 % (ref 12.4–15.4)
PH UA: 7
PLATELET # BLD: 191 K/UL (ref 135–450)
PMV BLD AUTO: 11.4 FL (ref 5–10.5)
PROTEIN UA: NEGATIVE MG/DL
RBC # BLD: 4.43 M/UL (ref 4–5.2)
RBC UA: ABNORMAL /HPF (ref 0–2)
SPECIFIC GRAVITY UA: 1.01
TOTAL PROTEIN: 7.7 G/DL (ref 6.4–8.2)
URINE TYPE: ABNORMAL
UROBILINOGEN, URINE: 0.2 E.U./DL
WBC # BLD: 8.3 K/UL (ref 4–11)
WBC UA: ABNORMAL /HPF (ref 0–5)

## 2018-04-12 LAB
ANA INTERPRETATION: NORMAL
ANTI-NUCLEAR ANTIBODY (ANA): NEGATIVE
URINE CULTURE, ROUTINE: NORMAL

## 2018-04-14 LAB — CENTROMERE AB IGG: 0 AU/ML (ref 0–40)

## 2018-04-17 ENCOUNTER — OFFICE VISIT (OUTPATIENT)
Dept: CARDIOLOGY CLINIC | Age: 48
End: 2018-04-17

## 2018-04-17 ENCOUNTER — HOSPITAL ENCOUNTER (OUTPATIENT)
Dept: OTHER | Age: 48
Discharge: HOME | End: 2018-04-17
Attending: INTERNAL MEDICINE | Admitting: INTERNAL MEDICINE

## 2018-04-17 ENCOUNTER — HOSPITAL ENCOUNTER (OUTPATIENT)
Dept: CARDIOLOGY | Facility: CLINIC | Age: 48
Discharge: HOME | End: 2018-04-17
Attending: INTERNAL MEDICINE | Admitting: INTERNAL MEDICINE

## 2018-04-17 VITALS
BODY MASS INDEX: 19.99 KG/M2 | OXYGEN SATURATION: 99 % | HEART RATE: 72 BPM | WEIGHT: 112.8 LBS | SYSTOLIC BLOOD PRESSURE: 116 MMHG | HEIGHT: 63 IN | DIASTOLIC BLOOD PRESSURE: 72 MMHG

## 2018-04-17 DIAGNOSIS — R06.02 SOB (SHORTNESS OF BREATH): Primary | ICD-10-CM

## 2018-04-17 DIAGNOSIS — R06.02 SOB (SHORTNESS OF BREATH): ICD-10-CM

## 2018-04-17 DIAGNOSIS — M34.9 SCLERODERMA (HCC): ICD-10-CM

## 2018-04-17 DIAGNOSIS — I47.1 SVT (SUPRAVENTRICULAR TACHYCARDIA) (HCC): ICD-10-CM

## 2018-04-17 DIAGNOSIS — R55 SYNCOPE, UNSPECIFIED SYNCOPE TYPE: ICD-10-CM

## 2018-04-17 DIAGNOSIS — R00.2 PALPITATIONS: ICD-10-CM

## 2018-04-17 DIAGNOSIS — Z79.899 HIGH RISK MEDICATION USE: ICD-10-CM

## 2018-04-17 LAB
A/G RATIO: 1.5 (ref 1.1–2.2)
ALBUMIN SERPL-MCNC: 4.9 G/DL (ref 3.4–5)
ALP BLD-CCNC: 39 U/L (ref 40–129)
ALT SERPL-CCNC: 17 U/L (ref 10–40)
ANION GAP SERPL CALCULATED.3IONS-SCNC: 22 MMOL/L (ref 3–16)
ANTI-THYROGLOB ABS: 84 IU/ML
AST SERPL-CCNC: 20 U/L (ref 15–37)
BASOPHILS ABSOLUTE: 0.1 K/UL (ref 0–0.2)
BASOPHILS RELATIVE PERCENT: 0.8 %
BILIRUB SERPL-MCNC: 0.5 MG/DL (ref 0–1)
BUN BLDV-MCNC: 11 MG/DL (ref 7–20)
CALCIUM SERPL-MCNC: 9.7 MG/DL (ref 8.3–10.6)
CHLORIDE BLD-SCNC: 97 MMOL/L (ref 99–110)
CHOLESTEROL, TOTAL: 196 MG/DL (ref 0–199)
CO2: 21 MMOL/L (ref 21–32)
CREAT SERPL-MCNC: 0.6 MG/DL (ref 0.6–1.1)
CREATININE URINE: 174.5 MG/DL (ref 28–259)
EOSINOPHILS ABSOLUTE: 0.2 K/UL (ref 0–0.6)
EOSINOPHILS RELATIVE PERCENT: 2.5 %
GFR AFRICAN AMERICAN: >60
GFR NON-AFRICAN AMERICAN: >60
GLOBULIN: 3.3 G/DL
GLUCOSE BLD-MCNC: 78 MG/DL (ref 70–99)
HCT VFR BLD CALC: 40 % (ref 36–48)
HDLC SERPL-MCNC: 81 MG/DL (ref 40–60)
HEMOGLOBIN: 13.3 G/DL (ref 12–16)
LDL CHOLESTEROL CALCULATED: 107 MG/DL
LV EF: 55 %
LVEF MODALITY: NORMAL
LYMPHOCYTES ABSOLUTE: 2.9 K/UL (ref 1–5.1)
LYMPHOCYTES RELATIVE PERCENT: 33.7 %
MAGNESIUM: 2.2 MG/DL (ref 1.8–2.4)
MCH RBC QN AUTO: 28.6 PG (ref 26–34)
MCHC RBC AUTO-ENTMCNC: 33.3 G/DL (ref 31–36)
MCV RBC AUTO: 85.9 FL (ref 80–100)
MICROALBUMIN UR-MCNC: <1.2 MG/DL
MICROALBUMIN/CREAT UR-RTO: NORMAL MG/G (ref 0–30)
MONOCYTES ABSOLUTE: 0.6 K/UL (ref 0–1.3)
MONOCYTES RELATIVE PERCENT: 6.7 %
NEUTROPHILS ABSOLUTE: 4.8 K/UL (ref 1.7–7.7)
NEUTROPHILS RELATIVE PERCENT: 56.3 %
PDW BLD-RTO: 15 % (ref 12.4–15.4)
PLATELET # BLD: 205 K/UL (ref 135–450)
PMV BLD AUTO: 12.2 FL (ref 5–10.5)
POTASSIUM SERPL-SCNC: 4 MMOL/L (ref 3.5–5.1)
PRO-BNP: 42 PG/ML (ref 0–124)
RBC # BLD: 4.65 M/UL (ref 4–5.2)
SODIUM BLD-SCNC: 140 MMOL/L (ref 136–145)
T3 FREE: 2.8 PG/ML (ref 2.3–4.2)
T4 FREE: 1.5 NG/DL (ref 0.9–1.8)
THYROID PEROXIDASE (TPO) ABS: 420 IU/ML
TOTAL PROTEIN: 8.2 G/DL (ref 6.4–8.2)
TRIGL SERPL-MCNC: 41 MG/DL (ref 0–150)
TSH SERPL DL<=0.05 MIU/L-ACNC: 1.91 UIU/ML (ref 0.27–4.2)
VITAMIN D 25-HYDROXY: 50.5 NG/ML
VLDLC SERPL CALC-MCNC: 8 MG/DL
WBC # BLD: 8.5 K/UL (ref 4–11)

## 2018-04-17 PROCEDURE — G8427 DOCREV CUR MEDS BY ELIG CLIN: HCPCS | Performed by: INTERNAL MEDICINE

## 2018-04-17 PROCEDURE — G8420 CALC BMI NORM PARAMETERS: HCPCS | Performed by: INTERNAL MEDICINE

## 2018-04-17 PROCEDURE — 1036F TOBACCO NON-USER: CPT | Performed by: INTERNAL MEDICINE

## 2018-04-17 PROCEDURE — 99215 OFFICE O/P EST HI 40 MIN: CPT | Performed by: INTERNAL MEDICINE

## 2018-04-18 ENCOUNTER — HOSPITAL ENCOUNTER (OUTPATIENT)
Dept: ULTRASOUND IMAGING | Age: 48
Discharge: HOME | End: 2018-04-18
Attending: INTERNAL MEDICINE | Admitting: INTERNAL MEDICINE

## 2018-04-18 DIAGNOSIS — E04.9 GOITER: ICD-10-CM

## 2018-04-18 DIAGNOSIS — E04.0 NONTOXIC DIFFUSE GOITER: ICD-10-CM

## 2018-04-18 DIAGNOSIS — I47.1 SVT (SUPRAVENTRICULAR TACHYCARDIA) (HCC): ICD-10-CM

## 2018-04-18 LAB
ESTIMATED AVERAGE GLUCOSE: 116.9 MG/DL
HBA1C MFR BLD: 5.7 %

## 2018-04-20 ENCOUNTER — TELEPHONE (OUTPATIENT)
Dept: CARDIOLOGY CLINIC | Age: 48
End: 2018-04-20

## 2018-05-29 ENCOUNTER — NURSE ONLY (OUTPATIENT)
Dept: CARDIOLOGY CLINIC | Age: 48
End: 2018-05-29

## 2018-05-29 ENCOUNTER — OFFICE VISIT (OUTPATIENT)
Dept: CARDIOLOGY CLINIC | Age: 48
End: 2018-05-29

## 2018-05-29 VITALS
SYSTOLIC BLOOD PRESSURE: 138 MMHG | WEIGHT: 118.12 LBS | DIASTOLIC BLOOD PRESSURE: 84 MMHG | HEART RATE: 68 BPM | HEIGHT: 63 IN | BODY MASS INDEX: 20.93 KG/M2

## 2018-05-29 DIAGNOSIS — I47.1 AVNRT (AV NODAL RE-ENTRY TACHYCARDIA) (HCC): ICD-10-CM

## 2018-05-29 DIAGNOSIS — I47.1 SVT (SUPRAVENTRICULAR TACHYCARDIA) (HCC): Primary | ICD-10-CM

## 2018-05-29 DIAGNOSIS — M34.9 SCLERODERMA (HCC): ICD-10-CM

## 2018-05-29 DIAGNOSIS — R55 SYNCOPE, UNSPECIFIED SYNCOPE TYPE: ICD-10-CM

## 2018-05-29 DIAGNOSIS — I10 ESSENTIAL HYPERTENSION: ICD-10-CM

## 2018-05-29 PROCEDURE — 1036F TOBACCO NON-USER: CPT | Performed by: INTERNAL MEDICINE

## 2018-05-29 PROCEDURE — G8420 CALC BMI NORM PARAMETERS: HCPCS | Performed by: INTERNAL MEDICINE

## 2018-05-29 PROCEDURE — 93000 ELECTROCARDIOGRAM COMPLETE: CPT | Performed by: INTERNAL MEDICINE

## 2018-05-29 PROCEDURE — G8427 DOCREV CUR MEDS BY ELIG CLIN: HCPCS | Performed by: INTERNAL MEDICINE

## 2018-05-29 PROCEDURE — 99214 OFFICE O/P EST MOD 30 MIN: CPT | Performed by: INTERNAL MEDICINE

## 2018-06-29 PROCEDURE — 93272 ECG/REVIEW INTERPRET ONLY: CPT | Performed by: INTERNAL MEDICINE

## 2018-07-11 ENCOUNTER — TELEPHONE (OUTPATIENT)
Dept: CARDIOLOGY CLINIC | Age: 48
End: 2018-07-11

## 2018-08-09 ENCOUNTER — OFFICE VISIT (OUTPATIENT)
Dept: RHEUMATOLOGY | Age: 48
End: 2018-08-09

## 2018-08-09 VITALS
DIASTOLIC BLOOD PRESSURE: 80 MMHG | HEART RATE: 72 BPM | BODY MASS INDEX: 21.04 KG/M2 | WEIGHT: 118.8 LBS | SYSTOLIC BLOOD PRESSURE: 118 MMHG

## 2018-08-09 DIAGNOSIS — H60.93: ICD-10-CM

## 2018-08-09 DIAGNOSIS — M34.9 SCLERODERMA (HCC): Primary | ICD-10-CM

## 2018-08-09 DIAGNOSIS — Z79.899 HIGH RISK MEDICATION USE: ICD-10-CM

## 2018-08-09 DIAGNOSIS — M35.00 SICCA SYNDROME (HCC): ICD-10-CM

## 2018-08-09 PROCEDURE — G8420 CALC BMI NORM PARAMETERS: HCPCS | Performed by: INTERNAL MEDICINE

## 2018-08-09 PROCEDURE — 4130F TOPICAL PREP RX AOE: CPT | Performed by: INTERNAL MEDICINE

## 2018-08-09 PROCEDURE — 1036F TOBACCO NON-USER: CPT | Performed by: INTERNAL MEDICINE

## 2018-08-09 PROCEDURE — 99214 OFFICE O/P EST MOD 30 MIN: CPT | Performed by: INTERNAL MEDICINE

## 2018-08-09 PROCEDURE — G8427 DOCREV CUR MEDS BY ELIG CLIN: HCPCS | Performed by: INTERNAL MEDICINE

## 2018-08-09 RX ORDER — LEVOTHYROXINE SODIUM 0.07 MG/1
75 TABLET ORAL DAILY
COMMUNITY
End: 2019-07-02

## 2018-08-09 RX ORDER — PREDNISONE 10 MG/1
TABLET ORAL
Qty: 38 TABLET | Refills: 0 | Status: SHIPPED | OUTPATIENT
Start: 2018-08-09 | End: 2018-08-19

## 2018-08-09 RX ORDER — FLUOCINONIDE 0.5 MG/G
OINTMENT TOPICAL
Qty: 60 G | Refills: 0 | Status: SHIPPED | OUTPATIENT
Start: 2018-08-09 | End: 2019-01-24

## 2018-08-09 NOTE — PROGRESS NOTES
[facility]) Physicians  [facility]  Rheumatology Progress Note  Leobardo Pennington MD            [x] [facility] Rheumatology         []  [facility] Rheumatology                                      [facility]                  [facility] [address], [address]      [phone]                [phone]      Fax: [phone]                 Fax: [phone]           ________________________________________________________________________        Patient Name:  Luis Nolan     Chief Complaint:     Returns today for followup of her scleroderma, Raynaud's and cervical neck discomfort. Since last seen, she remains on Plaquenil 200 mg twice daily. Has not restarted the Imuran. Since last seen, she tells me that she is doing better than her last office visit here. Her reflux symptoms have improved since she has been off of the Imuran. Her heart symptoms have also improved. Cardiac workup continues to be ongoing and she scheduled to seeing Dr. Yuri Rea next week for follow-up. Cardiac Holter monitor so far has been unrevealing. Biggest issue today that Madhav nielsen is dealing with his involving bilateral ears. She tells me that she has been having severe swelling redness pain involving the outside of bilateral ears. Symptoms would last for a few daysweeks. Noticeable by all family members and friends. Then symptoms would appear to be resolving and getting better only to return a few weeks later. Nothing makes her symptoms worse. She tells me that her symptoms feel worse than her costochondritis at times making ADLs very difficult. She continues to have discoloration involving her left lower extremity associated with pain and discomfort. However she continues to exercise on a regular basis. Recent laboratory studies that were done were reviewed.   Past medical/surgical history, medications and allergies are reviewed and updated as appropriate. Past Medical History:   Diagnosis Date    Alpha-1-antitrypsin deficiency ([facility] [address]     Blood circulation, collateral rynaud's disease    Cardiac arrhythmia     Clostridium difficile diarrhea     DNA probe positive 8/7/11    Colitis 7/2011    Salmonela positive    Diarrhea     Fibromyalgia     GERD (gastroesophageal reflux disease)     irritable bowel    Hashimoto thyroiditis     History of recurrent miscarriages     3    Incompetent cervix     3 cervical procedures. 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 [address]      Past Surgical History:   Procedure Laterality Date    ABLATION OF DYSRHYTHMIC FOCUS  03/2017    COLONOSCOPY  7/29/2011    CYSTOSCOPY  7/2011    DIAGNOSTIC CARDIAC CATH LAB PROCEDURE      DILATION AND CURETTAGE OF UTERUS      MOHS SURGERY Right 10/2015    VASC UPPER EXTREMITY VENOUS  UNI  01/2017    Left lower Leg    VENTRICULAR ABLATION SURGERY      VESICOVAGINAL FISTULA REPAIR  04/2017     Prior to Admission medications    Medication Sig Start Date End Date Taking? Authorizing Provider   calcium carbonate-vitamin D (CALTRATE 600+D) 600-400 MG-UNIT TABS Take  by mouth. Yes Historical Provider, MD   nitroglycerin (NITRO-BID) 2 % ointment Place 0.5 inches onto the skin every 6 hours. Yes Historical Provider, MD   Prenatal MV-Min-Fe Fum-FA-DHA (PRENATAL 1 PO) Take  by mouth.      Yes Historical Provider, MD   aspirin 81 MG EC tablet Take 81 mg by mouth daily. Yes Historical Provider, MD   Pyridoxine HCl (VITAMIN B-6) 50 MG tablet Take 50 mg by mouth daily. Yes Historical Provider, MD   Selenium 100 MCG CAPS Take 100 mcg by mouth daily. Yes Historical Provider, MD   Vitamin D (CHOLECALCIFEROL) 1000 UNIT CAPS capsule Take 1,000 Units by mouth daily. Yes Historical Provider, MD     Allergies   Allergen Reactions    Cymbalta [Duloxetine Hcl]      Swelling of face    Spectracef [Cefditoren Pivoxil] Shortness Of Breath, Itching and Swelling    Bactrim Rash    Cephalosporins     Other      TREE NUTS  APPLES    Proventil [Albuterol]     Robaxin [Methocarbamol]     Tramadol Itching     Mouth Itch    Ciprofloxacin Swelling and Rash    Dilaudid [Hydromorphone] Nausea And Vomiting       Review of Systems:   GENERAL:Fatigue Stable  HEENT: Denies having any Oral lesions, Or sores; Ongoing sicca symptoms with oral abscesses and recent oral surgery  LUNGS:Denies having had any new breathing difficulties   CARDIOVASCULAR:   Intermittent irregular heart rate   GI:     Much worse reflux symptoms  MUSCULOSKELETAL:   Stable arthralgias  SKIN:   Stable Raynaud's  ; severe redness swelling and pain that cyclical involving bilateral ears  LYMPHADENOPATHY: denies having had any recent infectious illness or lymphadenopathy     Physical Exam:   Wt Readings from Last 3 Encounters:   08/09/18 118 lb 12.8 oz (53.9 kg)   05/29/18 118 lb 1.9 oz (53.6 kg)   04/17/18 112 lb 12.8 oz (51.2 kg)       BP Readings from Last 3 Encounters:   08/09/18 118/80   05/29/18 138/84   04/17/18 116/72     Pulse Readings from Last 3 Encounters:   08/09/18 72   05/29/18 68   04/17/18 72       GENERAL:Able to ambulate without any assistance. HEENT: No evidence of any oral ulcers, lesions or sores;  Noted pursed mouth  LUNGS: Clear to auscultation bilaterally  CARDIO: Regular rate and rhythm; noted lower extremity increasing varicosities  MUSCULOSKELTAL: No evidence of any synovitis

## 2018-08-13 DIAGNOSIS — M34.9 SCLERODERMA (HCC): ICD-10-CM

## 2018-08-13 NOTE — TELEPHONE ENCOUNTER
From: Wendie Lindsay  Sent: 8/11/2018 8:48 AM EDT  Subject: Medication Renewal Request    Yazan Larsen.  [place] would like a refill of the following medications:     diclofenac sodium 1 % GEL Leon Davidson MD]    Preferred pharmacy: [address] [phone] - F [phone]

## 2018-08-14 ENCOUNTER — OFFICE VISIT (OUTPATIENT)
Dept: CARDIOLOGY CLINIC | Age: 48
End: 2018-08-14

## 2018-08-14 VITALS
HEIGHT: 63 IN | HEART RATE: 73 BPM | WEIGHT: 120.4 LBS | SYSTOLIC BLOOD PRESSURE: 125 MMHG | BODY MASS INDEX: 21.33 KG/M2 | DIASTOLIC BLOOD PRESSURE: 84 MMHG

## 2018-08-14 DIAGNOSIS — M34.9 SCLERODERMA (HCC): ICD-10-CM

## 2018-08-14 DIAGNOSIS — I47.1 SVT (SUPRAVENTRICULAR TACHYCARDIA) (HCC): ICD-10-CM

## 2018-08-14 DIAGNOSIS — I47.1 AVNRT (AV NODAL RE-ENTRY TACHYCARDIA) (HCC): ICD-10-CM

## 2018-08-14 DIAGNOSIS — I73.00 RAYNAUD'S DISEASE WITHOUT GANGRENE: ICD-10-CM

## 2018-08-14 DIAGNOSIS — I10 ESSENTIAL HYPERTENSION: ICD-10-CM

## 2018-08-14 DIAGNOSIS — R55 SYNCOPE, UNSPECIFIED SYNCOPE TYPE: Primary | ICD-10-CM

## 2018-08-14 PROCEDURE — 93000 ELECTROCARDIOGRAM COMPLETE: CPT | Performed by: INTERNAL MEDICINE

## 2018-08-14 PROCEDURE — 99214 OFFICE O/P EST MOD 30 MIN: CPT | Performed by: INTERNAL MEDICINE

## 2018-08-14 PROCEDURE — G8420 CALC BMI NORM PARAMETERS: HCPCS | Performed by: INTERNAL MEDICINE

## 2018-08-14 PROCEDURE — G8427 DOCREV CUR MEDS BY ELIG CLIN: HCPCS | Performed by: INTERNAL MEDICINE

## 2018-08-14 PROCEDURE — 1036F TOBACCO NON-USER: CPT | Performed by: INTERNAL MEDICINE

## 2018-08-14 NOTE — PROGRESS NOTES
[address]   Electrophysiology Follow up  Date: 8/14/2018    CC: SVT  HPI: Emilie Edwards is a 53 y.o. female with a complex PMH significant for Scleroderma, Alpha 1 antitrypsin deficiency, Raynauds and arthritis. She follows with rheumatologist, Dr. Maria Nur. She has a history of type II DM, and hyperthyroidism which was diagnosed in 2003. She had episodes of palpitation and SVT. 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 She was recently seen by  (4/17/18) for syncopal episode. She wore a 48 hour holter monitor 4/13/18-4/18/18 which was within normal limits. Her symptoms of Heart skipping were correlated with PVC/PACs and SOB/lightheadedness associated with SR. The syncopal episode occurred after breakfast when she was sitting at a table. Patient states that his son saw her stood up and she fell. She awoke on the floor surrounded by her kids. She did not have any episode of incontinence, no seizure like activity noted by children. Her children said it appeared she was getting ready to stand up when she fell. She was very sore from the fall. Interval history: Mrs. Annalise Felix arrives to office today for follow up after MCOT monitoring. She works out on the treadmill daily. She gets dizzy spells occasionally, she notes it happens frequently in shower. She has raynaud's and sometimes left leg is discolored. She follows with  for further evaluation of myasthenia gravis. She denies blacking out or passing out while wearing the MCOT.      Past Medical History:   Diagnosis Date    Alpha-1-antitrypsin deficiency ([facility] [address]     Blood circulation, collateral rynaud's disease    Cardiac arrhythmia     Clostridium difficile diarrhea     DNA probe positive 8/7/11    Colitis 7/2011    Salmonela positive    Diarrhea     Fibromyalgia     GERD (gastroesophageal reflux disease)     irritable bowel    Hashimoto thyroiditis     History of recurrent miscarriages     3    Incompetent cervix     3 cervical procedures.  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 one antitrypsin deficiency    Interstitial cystitis     Interstitial cystitis 6/2011    Liver disease     alpha I anti-trypsin disease    Lyme disease 2003    Mastocytosis     Peripheral neuropathy     Pregnancy in a diabetic mother     Rash     Raynaud disease     Reactive hypoglycemia     Scleroderma ([facility] [address] 2008    Thyroid dysfunction in pregnancy     Thyroiditis     Postpartum, resolved    Type II diabetes mellitus with neurological manifestations ([facility] [address]         Past Surgical History:   Procedure Laterality Date    ABLATION OF DYSRHYTHMIC FOCUS  03/2017    COLONOSCOPY  7/29/2011    CYSTOSCOPY  7/2011    DIAGNOSTIC CARDIAC CATH LAB PROCEDURE      DILATION AND CURETTAGE OF UTERUS      MOHS SURGERY Right 10/2015    VASC UPPER EXTREMITY VENOUS  UNI  01/2017    Left lower Leg    VENTRICULAR ABLATION SURGERY      VESICOVAGINAL FISTULA REPAIR  04/2017       Allergies   Allergen Reactions    Cymbalta [Duloxetine Hcl]      Swelling of face    Spectracef [Cefditoren Pivoxil] Shortness Of Breath, Itching and Swelling    Bactrim Rash    Cephalosporins     Other      TREE NUTS  APPLES    Proventil [Albuterol]     Robaxin [Methocarbamol]     Tramadol Itching     Mouth Itch    Ciprofloxacin Swelling and Rash    Dilaudid [Hydromorphone] Nausea And Vomiting       Medication:   Current Outpatient Prescriptions   Medication Sig Dispense Refill    diclofenac sodium 1 % GEL Apply 2 g topically 4 times daily as needed for Pain 1 Tube 5    levothyroxine (UNITHROID) 75 MCG tablet Take 75 mcg by mouth Daily      fluocinonide (LIDEX) 0.05 % ointment Apply sparingly to affected itchy flared area(s) up to bid prn 60 g 0    predniSONE (DELTASONE) 10 MG tablet Take 2 tabs for 10 days 1 for 10 days ,1/2 for 10 days then 1/2 qod for 10 then stop 38 tablet 0    Cholecalciferol ablation of AVNRT on 3/15/17. Post ablation she had brief nonsustained AT on isuprel.    - No recurrence of sustained heat racing or fluttering      - 6/1/16 [facility] showed normal coronary arteries and no PAH.       - HTN: controlled. /84 (Site: Left Arm, Position: Sitting, Cuff Size: Medium Adult)   Pulse 73   Ht 5' 3\" (1.6 m)   Wt 120 lb 6.4 oz (54.6 kg)   BMI 21.33 kg/m²      - Scleroderma   - Raynauds   - Probable myasthenia gravis     Thank you for allowing me to participate in the care of [address] [place] [address]. Further evaluation will be based upon the patient's clinical course and testing results. All questions and concerns were addressed to the patient/family. Alternatives to my treatment were discussed. Scribe's attestation: This note was scribed in the presence of Dejan Roberts M.D. by Daisy Gandhi RN.      Physician Attestation: I, Dr. Dejan Roberts, confirm that the scribe's documentation has been prepared under my direction and personally reviewed by me in its entirety. I also confirm that the note above accurately reflects all work, treatment, procedures, and medical decision making performed by me.       Dejan Roberts MD, MPH  [facility]   Office: [phone]

## 2018-08-14 NOTE — PATIENT INSTRUCTIONS
Patient Education        Lightheadedness or Faintness: Care Instructions  Your Care Instructions  Lightheadedness is a feeling that you are about to faint or \"pass out. \" You do not feel as if you or your surroundings are moving. It is different from vertigo, which is the feeling that you or things around you are spinning or tilting. Lightheadedness usually goes away or gets better when you lie down. If lightheadedness gets worse, it can lead to a fainting spell. It is common to feel lightheaded from time to time. Lightheadedness usually is not caused by a serious problem. It often is caused by a short-lasting drop in blood pressure and blood flow to your head that occurs when you get up too quickly from a seated or lying position. Follow-up care is a key part of your treatment and safety. Be sure to make and go to all appointments, and call your doctor if you are having problems. It's also a good idea to know your test results and keep a list of the medicines you take. How can you care for yourself at home? · Lie down for 1 or 2 minutes when you feel lightheaded. After lying down, sit up slowly and remain sitting for 1 to 2 minutes before slowly standing up. · Avoid movements, positions, or activities that have made you lightheaded in the past.  · Get plenty of rest, especially if you have a cold or flu, which can cause lightheadedness. · Make sure you drink plenty of fluids, especially if you have a fever or have been sweating. · Do not drive or put yourself and others in danger while you feel lightheaded. When should you call for help? Call 911 anytime you think you may need emergency care. For example, call if:    · You have symptoms of a stroke. These may include:  ¨ Sudden numbness, tingling, weakness, or loss of movement in your face, arm, or leg, especially on only one side of your body. ¨ Sudden vision changes. ¨ Sudden trouble speaking.   ¨ Sudden confusion or trouble understanding simple statements. ¨ Sudden problems with walking or balance. ¨ A sudden, severe headache that is different from past headaches.     · You have symptoms of a heart attack. These may include:  ¨ Chest pain or pressure, or a strange feeling in the chest.  ¨ Sweating. ¨ Shortness of breath. ¨ Nausea or vomiting. ¨ Pain, pressure, or a strange feeling in the back, neck, jaw, or upper belly or in one or both shoulders or arms. ¨ Lightheadedness or sudden weakness. ¨ A fast or irregular heartbeat. After you call 911, the  may tell you to chew 1 adult-strength or 2 to 4 low-dose aspirin. Wait for an ambulance. Do not try to drive yourself.    Watch closely for changes in your health, and be sure to contact your doctor if:    · Your lightheadedness gets worse or does not get better with home care. Where can you learn more? Go to https://[employer]REH. org and sign in to your [employer] account. Enter I025 in the [employer] box to learn more about \"Lightheadedness or Faintness: Care Instructions. \"     If you do not have an account, please click on the \"Sign Up Now\" link. Current as of: November 20, 2017  Content Version: 11.7  © 0346-0894 [employer]. Care instructions adapted under license by [facility] ([facility]). If you have questions about a medical condition or this instruction, always ask your healthcare professional. [place] [address] any warranty or liability for your use of this information.

## 2018-08-21 ENCOUNTER — HOSPITAL ENCOUNTER (OUTPATIENT)
Age: 48
Discharge: HOME | End: 2018-08-21
Payer: COMMERCIAL

## 2018-08-21 ENCOUNTER — OFFICE VISIT (OUTPATIENT)
Dept: CARDIOLOGY CLINIC | Age: 48
End: 2018-08-21

## 2018-08-21 VITALS
DIASTOLIC BLOOD PRESSURE: 68 MMHG | OXYGEN SATURATION: 99 % | HEIGHT: 63 IN | WEIGHT: 119 LBS | HEART RATE: 74 BPM | SYSTOLIC BLOOD PRESSURE: 110 MMHG | BODY MASS INDEX: 21.09 KG/M2

## 2018-08-21 DIAGNOSIS — Z79.899 HIGH RISK MEDICATION USE: ICD-10-CM

## 2018-08-21 DIAGNOSIS — M34.9 SCLERODERMA (HCC): ICD-10-CM

## 2018-08-21 DIAGNOSIS — R06.02 SOB (SHORTNESS OF BREATH): ICD-10-CM

## 2018-08-21 DIAGNOSIS — I27.21 PAH (PULMONARY ARTERY HYPERTENSION) (HCC): Primary | ICD-10-CM

## 2018-08-21 DIAGNOSIS — I27.21 PAH (PULMONARY ARTERY HYPERTENSION) (HCC): ICD-10-CM

## 2018-08-21 DIAGNOSIS — R35.0 URINE FREQUENCY: ICD-10-CM

## 2018-08-21 LAB
ALBUMIN SERPL-MCNC: 4.9 G/DL (ref 3.4–5)
ALP BLD-CCNC: 36 U/L (ref 40–129)
ALT SERPL-CCNC: 20 U/L (ref 10–40)
ANION GAP SERPL CALCULATED.3IONS-SCNC: 17 MMOL/L (ref 3–16)
AST SERPL-CCNC: 26 U/L (ref 15–37)
BILIRUB SERPL-MCNC: 0.4 MG/DL (ref 0–1)
BILIRUBIN DIRECT: <0.2 MG/DL (ref 0–0.3)
BILIRUBIN, INDIRECT: ABNORMAL MG/DL (ref 0–1)
BUN BLDV-MCNC: 9 MG/DL (ref 7–20)
C-REACTIVE PROTEIN: 0.4 MG/L (ref 0–5.1)
CALCIUM SERPL-MCNC: 9.6 MG/DL (ref 8.3–10.6)
CHLORIDE BLD-SCNC: 103 MMOL/L (ref 99–110)
CO2: 21 MMOL/L (ref 21–32)
CREAT SERPL-MCNC: 0.6 MG/DL (ref 0.6–1.1)
CREATININE URINE: 178.6 MG/DL (ref 28–259)
GFR AFRICAN AMERICAN: >60
GFR NON-AFRICAN AMERICAN: >60
GLUCOSE BLD-MCNC: 88 MG/DL (ref 70–99)
HCT VFR BLD CALC: 39 % (ref 36–48)
HEMOGLOBIN: 12.7 G/DL (ref 12–16)
MCH RBC QN AUTO: 28.6 PG (ref 26–34)
MCHC RBC AUTO-ENTMCNC: 32.6 G/DL (ref 31–36)
MCV RBC AUTO: 87.5 FL (ref 80–100)
MICROALBUMIN UR-MCNC: <1.2 MG/DL
MICROALBUMIN/CREAT UR-RTO: NORMAL MG/G (ref 0–30)
PDW BLD-RTO: 15 % (ref 12.4–15.4)
PLATELET # BLD: 199 K/UL (ref 135–450)
PMV BLD AUTO: 11.9 FL (ref 5–10.5)
POTASSIUM SERPL-SCNC: 3.7 MMOL/L (ref 3.5–5.1)
PRO-BNP: 85 PG/ML (ref 0–124)
RBC # BLD: 4.45 M/UL (ref 4–5.2)
SODIUM BLD-SCNC: 141 MMOL/L (ref 136–145)
TOTAL PROTEIN: 8.2 G/DL (ref 6.4–8.2)
WBC # BLD: 8.1 K/UL (ref 4–11)

## 2018-08-21 PROCEDURE — 99214 OFFICE O/P EST MOD 30 MIN: CPT | Performed by: INTERNAL MEDICINE

## 2018-08-21 PROCEDURE — 86140 C-REACTIVE PROTEIN: CPT

## 2018-08-21 PROCEDURE — 86235 NUCLEAR ANTIGEN ANTIBODY: CPT

## 2018-08-21 PROCEDURE — 86038 ANTINUCLEAR ANTIBODIES: CPT

## 2018-08-21 PROCEDURE — G8420 CALC BMI NORM PARAMETERS: HCPCS | Performed by: INTERNAL MEDICINE

## 2018-08-21 PROCEDURE — 86225 DNA ANTIBODY NATIVE: CPT

## 2018-08-21 PROCEDURE — 82043 UR ALBUMIN QUANTITATIVE: CPT

## 2018-08-21 PROCEDURE — 80048 BASIC METABOLIC PNL TOTAL CA: CPT

## 2018-08-21 PROCEDURE — G8427 DOCREV CUR MEDS BY ELIG CLIN: HCPCS | Performed by: INTERNAL MEDICINE

## 2018-08-21 PROCEDURE — 83516 IMMUNOASSAY NONANTIBODY: CPT

## 2018-08-21 PROCEDURE — 1036F TOBACCO NON-USER: CPT | Performed by: INTERNAL MEDICINE

## 2018-08-21 PROCEDURE — 82570 ASSAY OF URINE CREATININE: CPT

## 2018-08-21 PROCEDURE — 36415 COLL VENOUS BLD VENIPUNCTURE: CPT

## 2018-08-21 PROCEDURE — 85027 COMPLETE CBC AUTOMATED: CPT

## 2018-08-21 PROCEDURE — 86255 FLUORESCENT ANTIBODY SCREEN: CPT

## 2018-08-21 PROCEDURE — 80076 HEPATIC FUNCTION PANEL: CPT

## 2018-08-21 PROCEDURE — 83880 ASSAY OF NATRIURETIC PEPTIDE: CPT

## 2018-08-21 PROCEDURE — 82164 ANGIOTENSIN I ENZYME TEST: CPT

## 2018-08-21 RX ORDER — PREDNISONE 20 MG/1
20 TABLET ORAL DAILY
COMMUNITY
End: 2018-11-15

## 2018-08-21 NOTE — PROGRESS NOTES
normal  · Cervical veins are not engorged  · The carotid upstroke is normal in amplitude and contour without delay or bruit  · JVP less than 8 cm H2O  RRR with nl S1 and S2 without m,r,g  · Peripheral pulses are symmetrical and full  · There is no clubbing, cyanosis of the extremities. · No edema  · Femoral Arteries: 2+ and equal  · Pedal Pulses: 2+ and equal   Neck:  · JVP less than 8 cm H2O  · No thyromegaly  Abdomen:  · No masses or tenderness  · Liver/Spleen: No Abnormalities Noted  Neurological/Psychiatric:  · Alert and oriented in all spheres  · Moves all extremities well  · Exhibits normal gait balance and coordination  · No abnormalities of mood, affect, memory, mentation, or behavior are noted    ECHO 4/17/18 (today)  EF     Echo 4/11/17  Summary   Normal left ventricle systolic function with an estimated ejection fraction   of 55%. No regional wall motion abnormalities seen. Normal left ventricular diastolic filling pressure. Systolic pulmonary artery pressure (SPAP) is normal and estimated at 24 mmHg   (RA pressure 3 mmHg). CATH: 6/1/2016  PA 18/6 TZ-by echo RVSP 47-4/16  ~Findings  Normal pulmonary pressures and outputs  Normal coronary arteries   Recommendation  ~Aggressive medical treatment and risk factor modification      GXT Myoview-4/19/2016  Normal LV size and systolic function. Left ventricular ejection fraction of   70 %. Normal wall motion. There is a small anterolateral defect that is   present at rest and worse at stress concerning for ischemia. However cannot   rule out artifact from breast attenuation and increased bowel uptake of   radio tracer. ECHO: 4/19/2016  Normal left ventricle size,wall thickness and systolic function with an  estimated ejection fraction of 55-60%. No regional wall abnormalites are seen. Normal diastolic filling pattern for age. Mitral valve is structurally normal. No mitral stenosis. Mild Mitral regurgitation.   The aortic valve is structurally normal.No aortic stenosis. Trace aortic regurgitation. Tricuspid valve is structurally normal.  Mild tricuspid regurgitation. Systolic pulmonary artery pressure (SPAP) is estimated at 47 mmHg consistent  with mild pulmonary hypertension (RA pressure 8 mmHg). IVC is normal in size (< 2.1 cm) and collapses < 50% with respiration  consistent with elevated RA pressure (8 mmHg). no intracardiac mass, vegetations or thrombi. Assessment:  Problem List Items Addressed This Visit     Scleroderma ([facility] [address]    SOB (shortness of breath)    PAH (pulmonary artery hypertension) ([facility] [address] - Primary          Plan:    1. Labs - BNP, BMP,   2. Continue current medications  3. Follow up with me in 4-6 months      QUALITY MEASURES  1. Tobacco Cessation Counseling: NA  2. Retake of BP if >140/90:   NA  3. Documentation to PCP/referring for new patient:  Sent to PCP at close of office visit  4. CAD patient on anti-platelet: NA  5. CAD patient on STATIN therapy:  NA  6. Patient with CHF and aFib on anticoagulation:  NA     Scribe's attestation: This note was scribed in the presence of Sridevi Lennon M.D. By Mendoza Tabares RN    The scribe's documentation has been prepared under my direction and personally reviewed by me in its entirety. I confirm that the note above accurately reflects all work, treatment, procedures, and medical decision making performed by me. I appreciate the opportunity of cooperating in the care of this patient.     Sridevi Lennon M.D., [facility]

## 2018-08-21 NOTE — LETTER
she has had no more episodes of syncope. She does have fatigue, but thinks this is due to being busy with her 5 kids. She states she has been using the treadmill daily, but had not stop today due to a pain that she has in her left side. The pain is around her rib cage area. She had a recent adjustment in her thyroid medication and states she had gained some weight. She has some leg swelling at times. She states her swelling improves when she is active. She is following with Dr. Atif Lebron for endocrinology. She is currently on steroids due to inflammation in her ears.       Allergies   Allergen Reactions    Cymbalta [Duloxetine Hcl]      Swelling of face    Spectracef [Cefditoren Pivoxil] Shortness Of Breath, Itching and Swelling    Bactrim Rash    Cephalosporins     Other      TREE NUTS  APPLES    Proventil [Albuterol]     Robaxin [Methocarbamol]     Tramadol Itching     Mouth Itch    Ciprofloxacin Swelling and Rash    Dilaudid [Hydromorphone] Nausea And Vomiting     Current Outpatient Prescriptions   Medication Sig Dispense Refill    predniSONE (DELTASONE) 20 MG tablet Take 20 mg by mouth daily 20 mg 10 days, 10 mg 10 days, 5 mg etc.      diclofenac sodium 1 % GEL Apply 2 g topically 4 times daily as needed for Pain 1 Tube 5    levothyroxine (UNITHROID) 75 MCG tablet Take 75 mcg by mouth Daily      fluocinonide (LIDEX) 0.05 % ointment Apply sparingly to affected itchy flared area(s) up to bid prn 60 g 0    promethazine (PHENERGAN) 12.5 MG tablet TAKE ONE TABLET BY MOUTH EVERY 6 HOURS AS NEEDED FOR NAUSEA      Cholecalciferol (VITAMIN D3) 33106 units CAPS Take 1 capsule by mouth On Monday and thurs      hydroxychloroquine (PLAQUENIL) 200 MG tablet TAKE ONE TABLET BY MOUTH TWICE A DAY 60 tablet 10    fluticasone (FLONASE) 50 MCG/ACT nasal spray 1 spray by Nasal route daily      pyridostigmine (MESTINON) 60 MG tablet Take 60 mg by mouth daily  omeprazole (PRILOSEC) 20 MG capsule Take 20 mg by mouth daily      gabapentin (NEURONTIN) 100 MG capsule Take 300 mg by mouth 3 times daily. Takes 100 mg TID.  UNABLE TO FIND Bio med #[id number]      aspirin 81 MG EC tablet Take 81 mg by mouth daily. No current facility-administered medications for this visit. Past Medical History:   Diagnosis Date    Alpha-1-antitrypsin deficiency ([address]     Blood circulation, collateral rynaud's disease    Cardiac arrhythmia     Clostridium difficile diarrhea     DNA probe positive 8/7/11    Colitis 7/2011    Salmonela positive    Diarrhea     Fibromyalgia     GERD (gastroesophageal reflux disease)     irritable bowel    Hashimoto thyroiditis     History of recurrent miscarriages     3    Incompetent cervix     3 cervical procedures.  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 one antitrypsin deficiency    Interstitial cystitis     Interstitial cystitis 6/2011    Liver disease     alpha I anti-trypsin disease    Lyme disease 2003    Mastocytosis     Peripheral neuropathy     Pregnancy in a diabetic mother     Rash     Raynaud disease     Reactive hypoglycemia     Scleroderma ([address] 2008    Thyroid dysfunction in pregnancy     Thyroiditis     Postpartum, resolved    Type II diabetes mellitus with neurological manifestations ([address]      Past Surgical History:   Procedure Laterality Date    ABLATION OF DYSRHYTHMIC FOCUS  03/2017    COLONOSCOPY  7/29/2011    CYSTOSCOPY  7/2011    DIAGNOSTIC CARDIAC CATH LAB PROCEDURE      DILATION AND CURETTAGE OF UTERUS      MOHS SURGERY Right 10/2015    VASC UPPER EXTREMITY VENOUS  UNI  01/2017    Left lower Leg    VENTRICULAR ABLATION SURGERY      VESICOVAGINAL FISTULA REPAIR  04/2017     Family History   Problem Relation Age of Onset    Cancer Maternal Grandfather         colon cancer    Thyroid Disease Mother     Diabetes Maternal Grandmother     Heart Disease Maternal Grandmother GXT Myoview-4/19/2016  Normal LV size and systolic function. Left ventricular ejection fraction of   70 %. Normal wall motion. There is a small anterolateral defect that is   present at rest and worse at stress concerning for ischemia. However cannot   rule out artifact from breast attenuation and increased bowel uptake of   radio tracer. ECHO: 4/19/2016  Normal left ventricle size,wall thickness and systolic function with an  estimated ejection fraction of 55-60%. No regional wall abnormalites are seen. Normal diastolic filling pattern for age. Mitral valve is structurally normal. No mitral stenosis. Mild Mitral regurgitation. The aortic valve is structurally normal.No aortic stenosis. Trace aortic regurgitation. Tricuspid valve is structurally normal.  Mild tricuspid regurgitation. Systolic pulmonary artery pressure (SPAP) is estimated at 47 mmHg consistent  with mild pulmonary hypertension (RA pressure 8 mmHg). IVC is normal in size (< 2.1 cm) and collapses < 50% with respiration  consistent with elevated RA pressure (8 mmHg). no intracardiac mass, vegetations or thrombi. Assessment:  Problem List Items Addressed This Visit     Scleroderma ([facility] [address]    SOB (shortness of breath)    PAH (pulmonary artery hypertension) ([facility] [address] - Primary          Plan:    1. Labs - BNP, BMP,   2. Continue current medications  3. Follow up with me in 4-6 months      QUALITY MEASURES  1. Tobacco Cessation Counseling: NA  2. Retake of BP if >140/90:   NA  3. Documentation to PCP/referring for new patient:  Sent to PCP at close of office visit  4. CAD patient on anti-platelet: NA  5. CAD patient on STATIN therapy:  NA  6. Patient with CHF and aFib on anticoagulation:  NA     Chandaibe's attestation: This note was scribed in the presence of Oz Resendez M.D. By Sari Collier RN    The scribe's documentation has been prepared under my direction and personally reviewed by me in its entirety.   I confirm that the note above

## 2018-08-22 ENCOUNTER — OFFICE VISIT (OUTPATIENT)
Dept: PULMONOLOGY | Age: 48
End: 2018-08-22

## 2018-08-22 VITALS
DIASTOLIC BLOOD PRESSURE: 72 MMHG | SYSTOLIC BLOOD PRESSURE: 116 MMHG | WEIGHT: 120 LBS | HEIGHT: 63 IN | BODY MASS INDEX: 21.26 KG/M2 | OXYGEN SATURATION: 99 % | RESPIRATION RATE: 16 BRPM | TEMPERATURE: 98 F | HEART RATE: 68 BPM

## 2018-08-22 DIAGNOSIS — R06.02 SOB (SHORTNESS OF BREATH): Primary | ICD-10-CM

## 2018-08-22 DIAGNOSIS — M34.9 SCLERODERMA (HCC): ICD-10-CM

## 2018-08-22 LAB
ANA INTERPRETATION: NORMAL
ANCA IFA: NORMAL
ANGIOTENSIN CONVERTING ENZYME: 26 U/L (ref 9–67)
ANTI-NUCLEAR ANTIBODY (ANA): NEGATIVE
DOUBLE STRANDED DNA AB, IGG: NORMAL
ENA TO RNP ANTIBODY: NEGATIVE EU
ENA TO SMITH (SM) ANTIBODY: NEGATIVE EU
ENA TO SSA (RO) ANTIBODY: NEGATIVE EU
ENA TO SSB (LA) ANTIBODY: NEGATIVE EU

## 2018-08-22 PROCEDURE — 99213 OFFICE O/P EST LOW 20 MIN: CPT | Performed by: INTERNAL MEDICINE

## 2018-08-22 PROCEDURE — G8427 DOCREV CUR MEDS BY ELIG CLIN: HCPCS | Performed by: INTERNAL MEDICINE

## 2018-08-22 PROCEDURE — 1036F TOBACCO NON-USER: CPT | Performed by: INTERNAL MEDICINE

## 2018-08-22 PROCEDURE — G8420 CALC BMI NORM PARAMETERS: HCPCS | Performed by: INTERNAL MEDICINE

## 2018-08-23 LAB
CENTROMERE AB IGG: 0 AU/ML (ref 0–40)
SCLERODERMA (SCL-70) AB: 0 AU/ML (ref 0–40)

## 2018-09-17 NOTE — COMMUNICATION BODY
normal  · Cervical veins are not engorged  · The carotid upstroke is normal in amplitude and contour without delay or bruit  · JVP less than 8 cm H2O  RRR with nl S1 and S2 without m,r,g  · Peripheral pulses are symmetrical and full  · There is no clubbing, cyanosis of the extremities. · No edema  · Femoral Arteries: 2+ and equal  · Pedal Pulses: 2+ and equal   Neck:  · JVP less than 8 cm H2O  · No thyromegaly  Abdomen:  · No masses or tenderness  · Liver/Spleen: No Abnormalities Noted  Neurological/Psychiatric:  · Alert and oriented in all spheres  · Moves all extremities well  · Exhibits normal gait balance and coordination  · No abnormalities of mood, affect, memory, mentation, or behavior are noted    ECHO 4/17/18 (today)  EF     Echo 4/11/17  Summary   Normal left ventricle systolic function with an estimated ejection fraction   of 55%. No regional wall motion abnormalities seen. Normal left ventricular diastolic filling pressure. Systolic pulmonary artery pressure (SPAP) is normal and estimated at 24 mmHg   (RA pressure 3 mmHg). CATH: 6/1/2016  PA 18/6 TZ-by echo RVSP 47-4/16  ~Findings  Normal pulmonary pressures and outputs  Normal coronary arteries   Recommendation  ~Aggressive medical treatment and risk factor modification      GXT Myoview-4/19/2016  Normal LV size and systolic function. Left ventricular ejection fraction of   70 %. Normal wall motion. There is a small anterolateral defect that is   present at rest and worse at stress concerning for ischemia. However cannot   rule out artifact from breast attenuation and increased bowel uptake of   radio tracer. ECHO: 4/19/2016  Normal left ventricle size,wall thickness and systolic function with an  estimated ejection fraction of 55-60%. No regional wall abnormalites are seen. Normal diastolic filling pattern for age. Mitral valve is structurally normal. No mitral stenosis. Mild Mitral regurgitation.   The aortic valve is structurally

## 2018-09-26 PROBLEM — Z01.810 PREOP CARDIOVASCULAR EXAM: Status: RESOLVED | Noted: 2017-04-11 | Resolved: 2018-09-26

## 2018-11-15 ENCOUNTER — OFFICE VISIT (OUTPATIENT)
Dept: RHEUMATOLOGY | Age: 48
End: 2018-11-15
Payer: MEDICARE

## 2018-11-15 VITALS
DIASTOLIC BLOOD PRESSURE: 72 MMHG | WEIGHT: 120 LBS | HEART RATE: 74 BPM | SYSTOLIC BLOOD PRESSURE: 112 MMHG | BODY MASS INDEX: 21.26 KG/M2

## 2018-11-15 DIAGNOSIS — M94.1 RELAPSING POLYCHONDRITIS: ICD-10-CM

## 2018-11-15 DIAGNOSIS — M25.531 RIGHT WRIST PAIN: ICD-10-CM

## 2018-11-15 DIAGNOSIS — Z79.899 HIGH RISK MEDICATION USE: ICD-10-CM

## 2018-11-15 DIAGNOSIS — M34.9 SCLERODERMA (HCC): Primary | ICD-10-CM

## 2018-11-15 DIAGNOSIS — M35.00 SICCA SYNDROME (HCC): ICD-10-CM

## 2018-11-15 PROCEDURE — G8484 FLU IMMUNIZE NO ADMIN: HCPCS | Performed by: INTERNAL MEDICINE

## 2018-11-15 PROCEDURE — 99214 OFFICE O/P EST MOD 30 MIN: CPT | Performed by: INTERNAL MEDICINE

## 2018-11-15 PROCEDURE — G8420 CALC BMI NORM PARAMETERS: HCPCS | Performed by: INTERNAL MEDICINE

## 2018-11-15 PROCEDURE — G8427 DOCREV CUR MEDS BY ELIG CLIN: HCPCS | Performed by: INTERNAL MEDICINE

## 2018-11-15 PROCEDURE — 1036F TOBACCO NON-USER: CPT | Performed by: INTERNAL MEDICINE

## 2018-11-15 RX ORDER — HYDROCODONE BITARTRATE AND ACETAMINOPHEN 5; 325 MG/1; MG/1
1 TABLET ORAL EVERY 12 HOURS PRN
Qty: 14 TABLET | Refills: 0 | Status: SHIPPED | OUTPATIENT
Start: 2018-11-15 | End: 2018-11-22

## 2018-11-15 RX ORDER — AZATHIOPRINE 50 MG/1
50 TABLET ORAL DAILY
Qty: 30 TABLET | Refills: 2 | Status: SHIPPED | OUTPATIENT
Start: 2018-11-15 | End: 2019-02-12 | Stop reason: CLARIF

## 2018-11-15 RX ORDER — GABAPENTIN 100 MG/1
100 CAPSULE ORAL 2 TIMES DAILY
COMMUNITY

## 2018-11-15 RX ORDER — PREDNISONE 10 MG/1
TABLET ORAL
Qty: 40 TABLET | Refills: 0 | Status: SHIPPED | OUTPATIENT
Start: 2018-11-15 | End: 2019-05-16 | Stop reason: SDUPTHER

## 2018-12-18 ENCOUNTER — HOSPITAL ENCOUNTER (OUTPATIENT)
Dept: GENERAL RADIOLOGY | Age: 48
Discharge: HOME | End: 2018-12-18
Payer: COMMERCIAL

## 2018-12-18 ENCOUNTER — HOSPITAL ENCOUNTER (OUTPATIENT)
Age: 48
Discharge: HOME | End: 2018-12-18
Payer: COMMERCIAL

## 2018-12-18 DIAGNOSIS — M25.531 RIGHT WRIST PAIN: ICD-10-CM

## 2018-12-18 PROCEDURE — 73110 X-RAY EXAM OF WRIST: CPT

## 2019-01-24 ENCOUNTER — OFFICE VISIT (OUTPATIENT)
Dept: DERMATOLOGY | Age: 49
End: 2019-01-24
Payer: COMMERCIAL

## 2019-01-24 DIAGNOSIS — Z85.828 HISTORY OF NONMELANOMA SKIN CANCER: ICD-10-CM

## 2019-01-24 DIAGNOSIS — L57.0 ACTINIC KERATOSES: ICD-10-CM

## 2019-01-24 DIAGNOSIS — Z12.83 SCREENING EXAM FOR SKIN CANCER: ICD-10-CM

## 2019-01-24 DIAGNOSIS — Z80.8 FAMILY HISTORY OF MELANOMA: ICD-10-CM

## 2019-01-24 DIAGNOSIS — L30.9 DERMATITIS: ICD-10-CM

## 2019-01-24 DIAGNOSIS — D22.9 MULTIPLE BENIGN NEVI: Primary | ICD-10-CM

## 2019-01-24 PROCEDURE — G8427 DOCREV CUR MEDS BY ELIG CLIN: HCPCS | Performed by: DERMATOLOGY

## 2019-01-24 PROCEDURE — 17000 DESTRUCT PREMALG LESION: CPT | Performed by: DERMATOLOGY

## 2019-01-24 PROCEDURE — 1036F TOBACCO NON-USER: CPT | Performed by: DERMATOLOGY

## 2019-01-24 PROCEDURE — 99213 OFFICE O/P EST LOW 20 MIN: CPT | Performed by: DERMATOLOGY

## 2019-01-24 PROCEDURE — G8484 FLU IMMUNIZE NO ADMIN: HCPCS | Performed by: DERMATOLOGY

## 2019-01-24 PROCEDURE — G8420 CALC BMI NORM PARAMETERS: HCPCS | Performed by: DERMATOLOGY

## 2019-01-24 RX ORDER — BETAMETHASONE DIPROPIONATE 0.05 %
OINTMENT (GRAM) TOPICAL
Qty: 45 G | Refills: 0 | Status: SHIPPED | OUTPATIENT
Start: 2019-01-24

## 2019-02-12 ENCOUNTER — OFFICE VISIT (OUTPATIENT)
Dept: CARDIOLOGY CLINIC | Age: 49
End: 2019-02-12
Payer: COMMERCIAL

## 2019-02-12 ENCOUNTER — TELEPHONE (OUTPATIENT)
Dept: CARDIOLOGY CLINIC | Age: 49
End: 2019-02-12

## 2019-02-12 ENCOUNTER — HOSPITAL ENCOUNTER (OUTPATIENT)
Age: 49
Discharge: HOME | End: 2019-02-12
Payer: COMMERCIAL

## 2019-02-12 VITALS
BODY MASS INDEX: 21.3 KG/M2 | HEIGHT: 63 IN | WEIGHT: 120.2 LBS | OXYGEN SATURATION: 99 % | SYSTOLIC BLOOD PRESSURE: 134 MMHG | HEART RATE: 81 BPM | DIASTOLIC BLOOD PRESSURE: 74 MMHG

## 2019-02-12 DIAGNOSIS — Z79.899 HIGH RISK MEDICATION USE: ICD-10-CM

## 2019-02-12 DIAGNOSIS — R00.2 PALPITATIONS: ICD-10-CM

## 2019-02-12 DIAGNOSIS — M34.9 SCLERODERMA (HCC): ICD-10-CM

## 2019-02-12 DIAGNOSIS — R09.89 BRUIT: Primary | ICD-10-CM

## 2019-02-12 DIAGNOSIS — R06.02 SOB (SHORTNESS OF BREATH): Primary | ICD-10-CM

## 2019-02-12 DIAGNOSIS — R06.02 SOB (SHORTNESS OF BREATH): ICD-10-CM

## 2019-02-12 DIAGNOSIS — M54.2 NECK PAIN: ICD-10-CM

## 2019-02-12 DIAGNOSIS — M35.00 SICCA SYNDROME (HCC): ICD-10-CM

## 2019-02-12 LAB
A/G RATIO: 1.5 (ref 1.1–2.2)
ALBUMIN SERPL-MCNC: 4.8 G/DL
ALBUMIN SERPL-MCNC: 4.8 G/DL (ref 3.4–5)
ALP BLD-CCNC: 39 U/L
ALP BLD-CCNC: 40 U/L (ref 40–129)
ALT SERPL-CCNC: 23 U/L (ref 10–40)
ALT SERPL-CCNC: 29 U/L
ANION GAP SERPL CALCULATED.3IONS-SCNC: 18 MMOL/L (ref 3–16)
ANION GAP SERPL CALCULATED.3IONS-SCNC: NORMAL MMOL/L
AST SERPL-CCNC: 28 U/L (ref 15–37)
AST SERPL-CCNC: 36 U/L
BACTERIA: ABNORMAL /HPF
BILIRUB SERPL-MCNC: 0.3 MG/DL (ref 0.1–1.4)
BILIRUB SERPL-MCNC: 0.3 MG/DL (ref 0–1)
BILIRUBIN DIRECT: <0.2 MG/DL (ref 0–0.3)
BILIRUBIN URINE: NEGATIVE
BILIRUBIN, INDIRECT: NORMAL MG/DL (ref 0–1)
BLOOD, URINE: NEGATIVE
BUN BLDV-MCNC: 7 MG/DL
BUN BLDV-MCNC: 9 MG/DL (ref 7–20)
C-REACTIVE PROTEIN: <0.3 MG/L (ref 0–5.1)
CALCIUM SERPL-MCNC: 10.2 MG/DL (ref 8.3–10.6)
CALCIUM SERPL-MCNC: 9.5 MG/DL
CHLORIDE BLD-SCNC: 100 MMOL/L
CHLORIDE BLD-SCNC: 100 MMOL/L (ref 99–110)
CHOLESTEROL, TOTAL: 185 MG/DL
CHOLESTEROL/HDL RATIO: 2.3
CLARITY: CLEAR
CO2: 20 MMOL/L
CO2: 23 MMOL/L (ref 21–32)
COLOR: YELLOW
CREAT SERPL-MCNC: 0.7 MG/DL (ref 0.6–1.1)
CREAT SERPL-MCNC: 0.72 MG/DL
EPITHELIAL CELLS, UA: ABNORMAL /HPF
GFR AFRICAN AMERICAN: >60
GFR CALCULATED: 100
GFR NON-AFRICAN AMERICAN: >60
GLOBULIN: 3.3 G/DL
GLUCOSE BLD-MCNC: 84 MG/DL
GLUCOSE BLD-MCNC: 91 MG/DL (ref 70–99)
GLUCOSE URINE: NEGATIVE MG/DL
HCT VFR BLD CALC: 42.3 % (ref 36–48)
HDLC SERPL-MCNC: 82 MG/DL (ref 35–70)
HEMOGLOBIN: 13.6 G/DL (ref 12–16)
KETONES, URINE: NEGATIVE MG/DL
LDL CHOLESTEROL CALCULATED: 93 MG/DL (ref 0–160)
LEUKOCYTE ESTERASE, URINE: ABNORMAL
MCH RBC QN AUTO: 28.2 PG (ref 26–34)
MCHC RBC AUTO-ENTMCNC: 32.1 G/DL (ref 31–36)
MCV RBC AUTO: 87.9 FL (ref 80–100)
MICROSCOPIC EXAMINATION: YES
NITRITE, URINE: NEGATIVE
PDW BLD-RTO: 14.8 % (ref 12.4–15.4)
PH UA: 7.5
PLATELET # BLD: 211 K/UL (ref 135–450)
PMV BLD AUTO: 12.3 FL (ref 5–10.5)
POTASSIUM SERPL-SCNC: 4 MMOL/L
POTASSIUM SERPL-SCNC: 5 MMOL/L (ref 3.5–5.1)
PRO-BNP: 18 PG/ML (ref 0–124)
PROTEIN UA: NEGATIVE MG/DL
RBC # BLD: 4.82 M/UL (ref 4–5.2)
RBC UA: ABNORMAL /HPF (ref 0–2)
SEDIMENTATION RATE, ERYTHROCYTE: 11 MM/HR (ref 0–20)
SODIUM BLD-SCNC: 140 MMOL/L
SODIUM BLD-SCNC: 141 MMOL/L (ref 136–145)
SPECIFIC GRAVITY UA: 1.01
TOTAL PROTEIN: 7.4
TOTAL PROTEIN: 8.1 G/DL (ref 6.4–8.2)
TRIGL SERPL-MCNC: 51 MG/DL
URINE TYPE: ABNORMAL
UROBILINOGEN, URINE: 0.2 E.U./DL
VLDLC SERPL CALC-MCNC: 10 MG/DL
WBC # BLD: 7 K/UL (ref 4–11)
WBC UA: ABNORMAL /HPF (ref 0–5)

## 2019-02-12 PROCEDURE — 86038 ANTINUCLEAR ANTIBODIES: CPT

## 2019-02-12 PROCEDURE — G8420 CALC BMI NORM PARAMETERS: HCPCS | Performed by: INTERNAL MEDICINE

## 2019-02-12 PROCEDURE — 1036F TOBACCO NON-USER: CPT | Performed by: INTERNAL MEDICINE

## 2019-02-12 PROCEDURE — 81001 URINALYSIS AUTO W/SCOPE: CPT

## 2019-02-12 PROCEDURE — 99214 OFFICE O/P EST MOD 30 MIN: CPT | Performed by: INTERNAL MEDICINE

## 2019-02-12 PROCEDURE — 85027 COMPLETE CBC AUTOMATED: CPT

## 2019-02-12 PROCEDURE — 80053 COMPREHEN METABOLIC PANEL: CPT

## 2019-02-12 PROCEDURE — 86140 C-REACTIVE PROTEIN: CPT

## 2019-02-12 PROCEDURE — 85652 RBC SED RATE AUTOMATED: CPT

## 2019-02-12 PROCEDURE — 83880 ASSAY OF NATRIURETIC PEPTIDE: CPT

## 2019-02-12 PROCEDURE — G8484 FLU IMMUNIZE NO ADMIN: HCPCS | Performed by: INTERNAL MEDICINE

## 2019-02-12 PROCEDURE — 82248 BILIRUBIN DIRECT: CPT

## 2019-02-12 PROCEDURE — G8427 DOCREV CUR MEDS BY ELIG CLIN: HCPCS | Performed by: INTERNAL MEDICINE

## 2019-02-12 PROCEDURE — 36415 COLL VENOUS BLD VENIPUNCTURE: CPT

## 2019-02-13 LAB — ANTI-NUCLEAR ANTIBODY (ANA): NEGATIVE

## 2019-02-14 ENCOUNTER — HOSPITAL ENCOUNTER (OUTPATIENT)
Age: 49
Discharge: HOME | End: 2019-02-14
Payer: COMMERCIAL

## 2019-02-14 ENCOUNTER — OFFICE VISIT (OUTPATIENT)
Dept: RHEUMATOLOGY | Age: 49
End: 2019-02-14
Payer: COMMERCIAL

## 2019-02-14 ENCOUNTER — HOSPITAL ENCOUNTER (OUTPATIENT)
Dept: VASCULAR LAB | Age: 49
Discharge: HOME | End: 2019-02-14
Payer: COMMERCIAL

## 2019-02-14 VITALS
HEART RATE: 84 BPM | BODY MASS INDEX: 21.26 KG/M2 | WEIGHT: 120 LBS | SYSTOLIC BLOOD PRESSURE: 114 MMHG | DIASTOLIC BLOOD PRESSURE: 72 MMHG

## 2019-02-14 DIAGNOSIS — M94.1 RELAPSING POLYCHONDRITIS: ICD-10-CM

## 2019-02-14 DIAGNOSIS — R30.0 DYSURIA: ICD-10-CM

## 2019-02-14 DIAGNOSIS — Z79.899 HIGH RISK MEDICATION USE: ICD-10-CM

## 2019-02-14 DIAGNOSIS — M35.00 SICCA SYNDROME (HCC): ICD-10-CM

## 2019-02-14 DIAGNOSIS — M54.2 NECK PAIN: ICD-10-CM

## 2019-02-14 DIAGNOSIS — M34.9 SCLERODERMA (HCC): ICD-10-CM

## 2019-02-14 DIAGNOSIS — M34.9 SCLERODERMA (HCC): Primary | ICD-10-CM

## 2019-02-14 PROCEDURE — 99214 OFFICE O/P EST MOD 30 MIN: CPT | Performed by: INTERNAL MEDICINE

## 2019-02-14 PROCEDURE — G8484 FLU IMMUNIZE NO ADMIN: HCPCS | Performed by: INTERNAL MEDICINE

## 2019-02-14 PROCEDURE — 93880 EXTRACRANIAL BILAT STUDY: CPT

## 2019-02-14 PROCEDURE — 87086 URINE CULTURE/COLONY COUNT: CPT

## 2019-02-14 PROCEDURE — 1036F TOBACCO NON-USER: CPT | Performed by: INTERNAL MEDICINE

## 2019-02-14 PROCEDURE — G8427 DOCREV CUR MEDS BY ELIG CLIN: HCPCS | Performed by: INTERNAL MEDICINE

## 2019-02-14 PROCEDURE — G8420 CALC BMI NORM PARAMETERS: HCPCS | Performed by: INTERNAL MEDICINE

## 2019-02-15 LAB — URINE CULTURE, ROUTINE: NORMAL

## 2019-02-20 ENCOUNTER — OFFICE VISIT (OUTPATIENT)
Dept: PULMONOLOGY | Age: 49
End: 2019-02-20
Payer: COMMERCIAL

## 2019-02-20 ENCOUNTER — HOSPITAL ENCOUNTER (OUTPATIENT)
Dept: PULMONOLOGY | Age: 49
Discharge: HOME | End: 2019-02-20
Payer: COMMERCIAL

## 2019-02-20 VITALS
WEIGHT: 122 LBS | TEMPERATURE: 98.3 F | RESPIRATION RATE: 16 BRPM | HEIGHT: 63 IN | SYSTOLIC BLOOD PRESSURE: 110 MMHG | DIASTOLIC BLOOD PRESSURE: 70 MMHG | OXYGEN SATURATION: 99 % | BODY MASS INDEX: 21.62 KG/M2 | HEART RATE: 75 BPM

## 2019-02-20 DIAGNOSIS — M34.9 SCLERODERMA (HCC): ICD-10-CM

## 2019-02-20 DIAGNOSIS — R06.02 SOB (SHORTNESS OF BREATH): Primary | ICD-10-CM

## 2019-02-20 PROCEDURE — 94726 PLETHYSMOGRAPHY LUNG VOLUMES: CPT

## 2019-02-20 PROCEDURE — 1036F TOBACCO NON-USER: CPT | Performed by: INTERNAL MEDICINE

## 2019-02-20 PROCEDURE — 99213 OFFICE O/P EST LOW 20 MIN: CPT | Performed by: INTERNAL MEDICINE

## 2019-02-20 PROCEDURE — 94010 BREATHING CAPACITY TEST: CPT

## 2019-02-20 PROCEDURE — G8484 FLU IMMUNIZE NO ADMIN: HCPCS | Performed by: INTERNAL MEDICINE

## 2019-02-20 PROCEDURE — G8420 CALC BMI NORM PARAMETERS: HCPCS | Performed by: INTERNAL MEDICINE

## 2019-02-20 PROCEDURE — G8427 DOCREV CUR MEDS BY ELIG CLIN: HCPCS | Performed by: INTERNAL MEDICINE

## 2019-02-20 PROCEDURE — 94618 PULMONARY STRESS TESTING: CPT

## 2019-02-20 PROCEDURE — 94729 DIFFUSING CAPACITY: CPT

## 2019-05-16 ENCOUNTER — OFFICE VISIT (OUTPATIENT)
Dept: RHEUMATOLOGY | Age: 49
End: 2019-05-16
Payer: COMMERCIAL

## 2019-05-16 VITALS
SYSTOLIC BLOOD PRESSURE: 112 MMHG | WEIGHT: 125 LBS | HEART RATE: 66 BPM | HEIGHT: 63 IN | DIASTOLIC BLOOD PRESSURE: 72 MMHG | BODY MASS INDEX: 22.15 KG/M2

## 2019-05-16 DIAGNOSIS — M94.1 RELAPSING POLYCHONDRITIS: ICD-10-CM

## 2019-05-16 DIAGNOSIS — M35.00 SICCA SYNDROME (HCC): ICD-10-CM

## 2019-05-16 DIAGNOSIS — M34.9 SCLERODERMA (HCC): Primary | ICD-10-CM

## 2019-05-16 DIAGNOSIS — Z79.899 HIGH RISK MEDICATION USE: ICD-10-CM

## 2019-05-16 PROCEDURE — 99214 OFFICE O/P EST MOD 30 MIN: CPT | Performed by: INTERNAL MEDICINE

## 2019-05-16 PROCEDURE — 1036F TOBACCO NON-USER: CPT | Performed by: INTERNAL MEDICINE

## 2019-05-16 PROCEDURE — G8420 CALC BMI NORM PARAMETERS: HCPCS | Performed by: INTERNAL MEDICINE

## 2019-05-16 PROCEDURE — G8427 DOCREV CUR MEDS BY ELIG CLIN: HCPCS | Performed by: INTERNAL MEDICINE

## 2019-05-16 RX ORDER — LEVOTHYROXINE SODIUM 0.07 MG/1
75 TABLET ORAL DAILY
COMMUNITY

## 2019-05-16 RX ORDER — AZATHIOPRINE 50 MG/1
50 TABLET ORAL DAILY
Qty: 30 TABLET | Refills: 2 | Status: SHIPPED | OUTPATIENT
Start: 2019-05-16 | End: 2019-07-02

## 2019-05-16 RX ORDER — PREDNISONE 10 MG/1
TABLET ORAL
Qty: 40 TABLET | Refills: 2 | Status: SHIPPED | OUTPATIENT
Start: 2019-05-16 | End: 2019-05-26

## 2019-05-16 NOTE — PROGRESS NOTES
pursed mouth  LUNGS: Clear to auscultation bilaterally  CARDIO: Regular rate and rhythm; noted lower extremity   edema  MUSCULOSKELTAL: No evidence of any synovitis swelling noted. Evidence of sclerodactyly involving bilateral hands. Range of motion of the left wrist completely intact. Right wrist with noted limitation with extension. Flexion intact. .  Able to make a complete fist.  Tenderness to light palpation over the left cervical neck and over the left deltoid area. SKIN: Noted stable telangiectasias over her face and hands. Noted ulcer involving her right fifth DIP joint. LYMPADENOPATHY: No palpable lymph nodes       Labs:     Lab Results   Component Value Date    WBC 7.0 02/12/2019    HGB 13.6 02/12/2019    HCT 42.3 02/12/2019    MCV 87.9 02/12/2019     02/12/2019       Chemistry        Component Value Date/Time     02/12/2019 0936    K 5.0 02/12/2019 0936     02/12/2019 0936    CO2 23 02/12/2019 0936    BUN 9 02/12/2019 0936    CREATININE 0.7 02/12/2019 0936        Component Value Date/Time    CALCIUM 10.2 02/12/2019 0936    ALKPHOS 40 02/12/2019 0936    AST 28 02/12/2019 0936    ALT 23 02/12/2019 0936    BILITOT 0.3 02/12/2019 0936        Failed medications:  Imuran-GI upset      Assessment/Plan:      Assessment/Plan:  [place] was seen today for follow-up. Diagnoses and all orders for this visit:    Scleroderma ([facility] [address] - last FLORINA that was done was negative. Question if her underlying diagnosis made at this point in time the relapsing polychondritis. Recommended that she continue Plaquenil 200 mg twice a day and restart slowly the Imuran every other day 25 mg and then if well-tolerated 25 mg daily. Labs to be done before next office visit. Topical nifedipine cream to use on her ulcer. Conservative treatment options including clothing fully discussed with patient. Labs that were brought with her today in the office were reviewed. -     CBC;  Future  -     C-Reactive Protein;

## 2019-05-16 NOTE — PATIENT INSTRUCTIONS
Restart Imuran 25 mg [half a tablet] every other day for the next 2-3 weeks. If well-tolerated and start taking Imuran 25 mg daily.

## 2019-07-01 NOTE — PROGRESS NOTES
[employer]   Advanced Heart Failure/Pulmonary Hypertension  Cardiac Evaluation      Joana Long  YOB: 1970    Date of Visit:  7/2/19      Chief Complaint   Patient presents with    Follow-up    Shortness of Breath    Fatigue    Edema         History of Present Illness: This 53 y.o. female is seen at the request of Dr. Minerva Armstrong for consultation of [address]. She was seen by Dr. Mary Curry in the past for her [address] but wants to switch to me. She has a complex history of PAH, Scleroderma, Alpha 1 antitrypsin deficiency, Raynauds and arthritis. She had thyroid dysfunction and diabetes with her pregnancy. She also has cervical stenosis. She is a mother of 5 children. She sees Dr. Vincent Cerda yearly for scleroderma and gets yearly CT scans. She sees endocrinologist for thyroid problems and diabetes. Was having ptosis on her eye with double vision and was started on high dose prednisone. She wore a 24 hour monitor but no racing heart beats at the time she was wearing it. She was seen by EPO years ago and has tried a medication name unknown to slow the HR but her BP went too low and the medication was stopped. Family history includes GM with heart disease at age [de-identified]. Her other GM has a pacemaker. She underwent R & LHC on 6/1/2016 that revealed no CAD and no PHTN. She has been following with Dr Vincent Cerda for scleroderma. She underwent an SVT ablation on 3/15/17. She wore a 48 hour holter monitor. Her preliminary echo from 4/11/17 shows normal function. On 4/5/18 she was sitting at her kitchen bench and fainted. She states she does not remember going down. She states this has never happened before. She wore a 48 hour holter monitor 4/13/18-4/18/18 which was within normal limits. She states she is getting a ultrasound of her thyroid on 4/18/18 and lab work. She had an Echo 04/17/18 showed EF of  60%. She wore a AGNES from 5/29-6/27/18 that showed PAC's and PVC\"S.   She states she has had no more episodes of syncope. She is following with Dr. Ramana Soliz for endocrinology. On 02/11/19 she reports she had left sided neck pain. About 2.5 weeks ago while working out on the treadmill, she had left side neck pain that radiates into the temple and she couldn't hear out of her left ear. She reports since then she has nerve pain and ongoing jaw pain. She reports the sensation \"flares up\" since then. She states the hearing loss lasted 30 mins. She has been taking ibuprofen 600 mg. She states it feels vascular and feels it may rupture. Her neck is tender to palpation. She has SOB with climbing steps which is not new. She reports occasional palpitations if she squats down. She takes an aspirin 81 mg daily. Today her preliminary echo shows her pressures appear to be normal and her EF appears to be normal. She has SOB, BLE edema and fatigue. She has SOB with exercising on the treadmill. She reports she feels the fatigue is due to switching thyroid medications. Allergies   Allergen Reactions    Cymbalta [Duloxetine Hcl]      Swelling of face    Imuran [Azathioprine] Nausea And Vomiting    Spectracef [Cefditoren Pivoxil] Shortness Of Breath, Itching and Swelling    Bactrim Rash    Cephalosporins     Other      TREE NUTS  APPLES    Proventil [Albuterol]     Robaxin [Methocarbamol]     Tramadol Itching     Mouth Itch    Ciprofloxacin Swelling and Rash    Dilaudid [Hydromorphone] Nausea And Vomiting     Current Outpatient Medications   Medication Sig Dispense Refill    Calcium Carbonate-Vitamin D (CALTRATE 600+D PO) Take by mouth      levothyroxine (SYNTHROID) 75 MCG tablet Take 75 mcg by mouth Daily      betamethasone dipropionate (DIPROLENE) 0.05 % ointment Apply sparingly to affected area(s) bid prn for flares, up to 2 weeks at a time on any given area. Do not apply on cleared skin.  45 g 0    hydroxychloroquine (PLAQUENIL) 200 MG tablet TAKE ONE TABLET BY MOUTH TWICE A DAY 60 tablet 11    gabapentin (NEURONTIN) 100 MG capsule Take 100 mg by mouth 2 times daily. Alexis Orourke diclofenac sodium 1 % GEL Apply 2 g topically 4 times daily as needed for Pain 1 Tube 5    promethazine (PHENERGAN) 12.5 MG tablet TAKE ONE TABLET BY MOUTH EVERY 6 HOURS AS NEEDED FOR NAUSEA      fluticasone (FLONASE) 50 MCG/ACT nasal spray 1 spray by Nasal route daily      pyridostigmine (MESTINON) 60 MG tablet Take 60 mg by mouth daily       omeprazole (PRILOSEC) 20 MG capsule Take 20 mg by mouth daily      aspirin 81 MG EC tablet Take 81 mg by mouth daily. No current facility-administered medications for this visit. Past Medical History:   Diagnosis Date    Alpha-1-antitrypsin deficiency ([facility] [address]     Blood circulation, collateral rynaud's disease    Cardiac arrhythmia     Clostridium difficile diarrhea     DNA probe positive 8/7/11    Colitis 7/2011    Salmonela positive    Diarrhea     Fibromyalgia     GERD (gastroesophageal reflux disease)     irritable bowel    Hashimoto thyroiditis     History of recurrent miscarriages     3    Incompetent cervix     3 cervical procedures.  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 one antitrypsin deficiency    Interstitial cystitis     Interstitial cystitis 6/2011    Liver disease     alpha I anti-trypsin disease    Lyme disease 2003    Mastocytosis     Peripheral neuropathy     Pregnancy in a diabetic mother     Rash     Raynaud disease     Reactive hypoglycemia     Scleroderma ([facility] [address] 2008    Thyroid dysfunction in pregnancy     Thyroiditis     Postpartum, resolved    Type II diabetes mellitus with neurological manifestations [facility])      Past Surgical History:   Procedure Laterality Date    ABLATION OF DYSRHYTHMIC FOCUS  03/2017    COLONOSCOPY  7/29/2011    CYSTOSCOPY  7/2011    DIAGNOSTIC CARDIAC CATH LAB PROCEDURE      DILATION AND CURETTAGE OF UTERUS      MOHS SURGERY Right 10/2015    VASC UPPER EXTREMITY VENOUS  UNI  01/2017    Left

## 2019-07-02 ENCOUNTER — OFFICE VISIT (OUTPATIENT)
Dept: CARDIOLOGY CLINIC | Age: 49
End: 2019-07-02
Payer: COMMERCIAL

## 2019-07-02 ENCOUNTER — HOSPITAL ENCOUNTER (OUTPATIENT)
Dept: CARDIOLOGY | Age: 49
Discharge: HOME | End: 2019-07-02
Payer: COMMERCIAL

## 2019-07-02 VITALS
BODY MASS INDEX: 21.65 KG/M2 | SYSTOLIC BLOOD PRESSURE: 112 MMHG | OXYGEN SATURATION: 99 % | DIASTOLIC BLOOD PRESSURE: 66 MMHG | HEIGHT: 63 IN | WEIGHT: 122.2 LBS | HEART RATE: 75 BPM

## 2019-07-02 DIAGNOSIS — R06.02 SOB (SHORTNESS OF BREATH): Primary | ICD-10-CM

## 2019-07-02 DIAGNOSIS — R00.2 PALPITATIONS: ICD-10-CM

## 2019-07-02 DIAGNOSIS — I27.21 PAH (PULMONARY ARTERY HYPERTENSION) (HCC): ICD-10-CM

## 2019-07-02 DIAGNOSIS — R06.02 SOB (SHORTNESS OF BREATH): ICD-10-CM

## 2019-07-02 DIAGNOSIS — M34.9 SCLERODERMA (HCC): ICD-10-CM

## 2019-07-02 LAB
LV EF: 55 %
LVEF MODALITY: NORMAL

## 2019-07-02 PROCEDURE — 99214 OFFICE O/P EST MOD 30 MIN: CPT | Performed by: INTERNAL MEDICINE

## 2019-07-02 PROCEDURE — G8427 DOCREV CUR MEDS BY ELIG CLIN: HCPCS | Performed by: INTERNAL MEDICINE

## 2019-07-02 PROCEDURE — G8420 CALC BMI NORM PARAMETERS: HCPCS | Performed by: INTERNAL MEDICINE

## 2019-07-02 PROCEDURE — 1036F TOBACCO NON-USER: CPT | Performed by: INTERNAL MEDICINE

## 2019-07-02 PROCEDURE — 93306 TTE W/DOPPLER COMPLETE: CPT

## 2019-07-02 NOTE — LETTER
 Not on file       Review of Systems:   · Constitutional: there has been no unanticipated weight loss. There's been no change in energy level, sleep pattern, or activity level. · Eyes: No visual changes or diplopia. No scleral icterus. · ENT: No Headaches, hearing loss or vertigo. No mouth sores or sore throat. · Cardiovascular: Reviewed in HPI  · Respiratory: No cough or wheezing, no sputum production. No hematemesis. · Gastrointestinal: No abdominal pain, appetite loss, blood in stools. No change in bowel or bladder habits. · Genitourinary: No dysuria, trouble voiding, or hematuria. · Musculoskeletal:  No gait disturbance, weakness or joint complaints. · Integumentary: No rash or pruritis. · Neurological: No headache, diplopia, change in muscle strength, numbness or tingling. No change in gait, balance, coordination, mood, affect, memory, mentation, behavior. · Psychiatric: No anxiety, no depression. · Endocrine: No malaise, fatigue or temperature intolerance. No excessive thirst, fluid intake, or urination. No tremor. · Hematologic/Lymphatic: No abnormal bruising or bleeding, blood clots or swollen lymph nodes. · Allergic/Immunologic: No nasal congestion or hives. Physical Examination:    Vitals:    07/02/19 1340   BP: 112/66   Pulse: 75   SpO2: 99%   Weight: 122 lb 3.2 oz (55.4 kg)   Height: 5' 3\" (1.6 m)     Body mass index is 21.65 kg/m².      Wt Readings from Last 3 Encounters:   07/02/19 122 lb 3.2 oz (55.4 kg)   05/16/19 125 lb (56.7 kg)   02/20/19 122 lb (55.3 kg)     BP Readings from Last 3 Encounters:   07/02/19 112/66   05/16/19 112/72   02/20/19 110/70        Constitutional and General Appearance:   WD/WN in NAD  HEENT:  NC/AT  Skin:  Warm, dry  Respiratory:  · Normal excursion and expansion without use of accessory muscles  · Resp Auscultation: Normal breath sounds without dullness  Cardiovascular:  · The apical impulses not displaced  · Heart tones are crisp and normal

## 2019-07-18 LAB
ALBUMIN SERPL-MCNC: 4.6 G/DL
ALP BLD-CCNC: 39 U/L
ALT SERPL-CCNC: 21 U/L
ANION GAP SERPL CALCULATED.3IONS-SCNC: NORMAL MMOL/L
AST SERPL-CCNC: 26 U/L
B-TYPE NATRIURETIC PEPTIDE: 21.2 PG/ML
BILIRUB SERPL-MCNC: 0.5 MG/DL (ref 0.1–1.4)
BUN BLDV-MCNC: 10 MG/DL
CALCIUM SERPL-MCNC: 9.6 MG/DL
CHLORIDE BLD-SCNC: 102 MMOL/L
CO2: 21 MMOL/L
CREAT SERPL-MCNC: 0.75 MG/DL
GFR CALCULATED: 95
GLUCOSE BLD-MCNC: 87 MG/DL
POTASSIUM SERPL-SCNC: 4.5 MMOL/L
SODIUM BLD-SCNC: 139 MMOL/L
TOTAL PROTEIN: 7.4

## 2019-07-19 ENCOUNTER — TELEPHONE (OUTPATIENT)
Dept: CARDIOLOGY CLINIC | Age: 49
End: 2019-07-19

## 2019-07-19 NOTE — TELEPHONE ENCOUNTER
Created telephone encounter. Per Pt HIPAA from can leave results on machine. LMOM relaying message per NPRB regarding labs. Pt to call the office with any concerns.

## 2019-08-12 NOTE — PROGRESS NOTES
irritable bowel    Hashimoto thyroiditis     History of recurrent miscarriages     3    Incompetent cervix     3 cervical procedures. 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 one antitrypsin deficiency    Interstitial cystitis     Interstitial cystitis 6/2011    Liver disease     alpha I anti-trypsin disease    Lyme disease 2003    Mastocytosis     Peripheral neuropathy     Pregnancy in a diabetic mother     Rash     Raynaud disease     Reactive hypoglycemia     Scleroderma ([facility] [address] 2008    Thyroid dysfunction in pregnancy     Thyroiditis     Postpartum, resolved    Type II diabetes mellitus with neurological manifestations ([facility] [address]         Past Surgical History:   Procedure Laterality Date    ABLATION OF DYSRHYTHMIC FOCUS  03/2017    COLONOSCOPY  7/29/2011    CYSTOSCOPY  7/2011    DIAGNOSTIC CARDIAC CATH LAB PROCEDURE      DILATION AND CURETTAGE OF UTERUS      MOHS SURGERY Right 10/2015    VASC UPPER EXTREMITY VENOUS  UNI  01/2017    Left lower Leg    VENTRICULAR ABLATION SURGERY      VESICOVAGINAL FISTULA REPAIR  04/2017       Allergies   Allergen Reactions    Cymbalta [Duloxetine Hcl]      Swelling of face    Imuran [Azathioprine] Nausea And Vomiting    Spectracef [Cefditoren Pivoxil] Shortness Of Breath, Itching and Swelling    Bactrim Rash    Cephalosporins     Other      TREE NUTS  APPLES    Proventil [Albuterol]     Robaxin [Methocarbamol]     Tramadol Itching     Mouth Itch    Ciprofloxacin Swelling and Rash    Dilaudid [Hydromorphone] Nausea And Vomiting       Medication:   Current Outpatient Medications   Medication Sig Dispense Refill    Calcium Carbonate-Vitamin D (CALTRATE 600+D PO) Take by mouth      levothyroxine (SYNTHROID) 75 MCG tablet Take 88 mcg by mouth Daily       betamethasone dipropionate (DIPROLENE) 0.05 % ointment Apply sparingly to affected area(s) bid prn for flares, up to 2 weeks at a time on any given area. Do not apply on cleared skin.  39 g 0    hydroxychloroquine (PLAQUENIL) 200 MG tablet TAKE ONE TABLET BY MOUTH TWICE A DAY 60 tablet 11    gabapentin (NEURONTIN) 100 MG capsule Take 100 mg by mouth 2 times daily. Som Cutting diclofenac sodium 1 % GEL Apply 2 g topically 4 times daily as needed for Pain 1 Tube 5    promethazine (PHENERGAN) 12.5 MG tablet TAKE ONE TABLET BY MOUTH EVERY 6 HOURS AS NEEDED FOR NAUSEA      fluticasone (FLONASE) 50 MCG/ACT nasal spray 1 spray by Nasal route daily      pyridostigmine (MESTINON) 60 MG tablet Take 60 mg by mouth daily       omeprazole (PRILOSEC) 20 MG capsule Take 20 mg by mouth daily      aspirin 81 MG EC tablet Take 81 mg by mouth daily. No current facility-administered medications for this visit. Social History:   reports that she has never smoked. She has never used smokeless tobacco. She reports that she does not drink alcohol or use drugs. Family History:  family history includes Cancer in her maternal grandfather, maternal grandmother, and maternal uncle; Diabetes in her maternal grandmother; Heart Disease in her maternal grandmother; Other in her daughter; Thyroid Disease in her maternal grandmother and mother. Review of System:    · General: negative for fever, chills   · Ophthalmic ROS: negative for - eye pain or loss of vision  · ENT ROS: negative for - headaches, sore throat   · Respiratory: negative for - cough, sputum  · Cardiovascular: Reviewed in HPI  · Gastrointestinal: negative for - abdominal pain, diarrhea, N/V  · Hematology: negative for - bleeding, blood clots, bruising or jaundice  · Genito-Urinary:  negative for - Dysuria or incontinence  · Musculoskeletal: negative for - Joint swelling, muscle pain  · Neurological: negative for - confusion, dizziness, headaches   · Psychiatric: No anxiety, no depression.   · Dermatological: negative for - rash     Physical Examination:  Vitals:    08/13/19 0909   BP: 132/74   Pulse: 76   Resp: 18        ·  Constitutional: Oriented. No distress. · Head: Normocephalic and atraumatic. · Mouth/Throat: Oropharynx is clear and moist.   · Eyes: Conjunctivae normal. EOM are normal.   · Neck: Neck supple. No JVD present. · Cardiovascular: Normal rate, regular rhythm, S1&S2. · Pulmonary/Chest: Bilateral respiratory sounds. No rhonchi. · Abdominal: Soft. No tenderness. · Musculoskeletal: No tenderness. No edema    · Lymphadenopathy: Has no cervical adenopathy. · Neurological: Alert and oriented. Follows command, No Gross deficit   · Skin: Skin is warm, No rash noted. · Psychiatric: Has a normal behavior     Labs:  Lab Results   Component Value Date    TSHREFLEX 0.24 2013    TSH 1.91 2018    TSH 1.58 10/27/2017    CREATININE 0.75 2019    CREATININE 0.7 2019    AST 26 2019    ALT 21 2019       Reviewed. EC19  Sinus Rhythm  QTc 403    Echo 19   Summary   Normal left ventricle systolic function with an estimated ejection fraction   of 55%. No regional wall motion abnormalities are seen.   Normal left ventricular diastolic filling pressure.   Mild aortic regurgitation.   Mild mitral and tricuspid regurgitation.   Systolic pulmonary artery pressure (SPAP) is normal and estimated at 23 mmHg   (right atrial pressure 3 mmHg).    Echo: 16  Summary  Normal left ventricle size,wall thickness and systolic function with an  estimated ejection fraction of 55-60%. No regional wall abnormalites are seen. Normal diastolic filling pattern for age. Mitral valve is structurally normal. No mitral stenosis. Mild Mitral regurgitation. The aortic valve is structurally normal.No aortic stenosis. Trace aortic regurgitation. Tricuspid valve is structurally normal.  Mild tricuspid regurgitation. Systolic pulmonary artery pressure (SPAP) is estimated at 47 mmHg consistent  with mild pulmonary hypertension (RA pressure 8 mmHg).   IVC is normal in size (< 2.1 cm) and collapses < 50% with

## 2019-08-13 ENCOUNTER — OFFICE VISIT (OUTPATIENT)
Dept: CARDIOLOGY CLINIC | Age: 49
End: 2019-08-13
Payer: COMMERCIAL

## 2019-08-13 VITALS
WEIGHT: 122 LBS | BODY MASS INDEX: 21.62 KG/M2 | DIASTOLIC BLOOD PRESSURE: 74 MMHG | RESPIRATION RATE: 18 BRPM | SYSTOLIC BLOOD PRESSURE: 132 MMHG | HEART RATE: 76 BPM | HEIGHT: 63 IN

## 2019-08-13 DIAGNOSIS — I47.1 SVT (SUPRAVENTRICULAR TACHYCARDIA) (HCC): Primary | ICD-10-CM

## 2019-08-13 PROCEDURE — 1036F TOBACCO NON-USER: CPT | Performed by: INTERNAL MEDICINE

## 2019-08-13 PROCEDURE — G8427 DOCREV CUR MEDS BY ELIG CLIN: HCPCS | Performed by: INTERNAL MEDICINE

## 2019-08-13 PROCEDURE — 99214 OFFICE O/P EST MOD 30 MIN: CPT | Performed by: INTERNAL MEDICINE

## 2019-08-13 PROCEDURE — 93000 ELECTROCARDIOGRAM COMPLETE: CPT | Performed by: INTERNAL MEDICINE

## 2019-08-13 PROCEDURE — G8420 CALC BMI NORM PARAMETERS: HCPCS | Performed by: INTERNAL MEDICINE

## 2019-08-21 ENCOUNTER — OFFICE VISIT (OUTPATIENT)
Dept: PULMONOLOGY | Age: 49
End: 2019-08-21
Payer: COMMERCIAL

## 2019-08-21 ENCOUNTER — HOSPITAL ENCOUNTER (OUTPATIENT)
Dept: PULMONOLOGY | Age: 49
Discharge: HOME | End: 2019-08-21
Payer: COMMERCIAL

## 2019-08-21 VITALS
HEART RATE: 73 BPM | WEIGHT: 124 LBS | OXYGEN SATURATION: 98 % | RESPIRATION RATE: 14 BRPM | SYSTOLIC BLOOD PRESSURE: 118 MMHG | TEMPERATURE: 98.9 F | HEIGHT: 63 IN | DIASTOLIC BLOOD PRESSURE: 76 MMHG | BODY MASS INDEX: 21.97 KG/M2

## 2019-08-21 VITALS — OXYGEN SATURATION: 100 %

## 2019-08-21 DIAGNOSIS — R06.02 SOB (SHORTNESS OF BREATH): ICD-10-CM

## 2019-08-21 DIAGNOSIS — M34.9 SCLERODERMA (HCC): Primary | ICD-10-CM

## 2019-08-21 PROCEDURE — G8420 CALC BMI NORM PARAMETERS: HCPCS | Performed by: INTERNAL MEDICINE

## 2019-08-21 PROCEDURE — 94726 PLETHYSMOGRAPHY LUNG VOLUMES: CPT

## 2019-08-21 PROCEDURE — 94760 N-INVAS EAR/PLS OXIMETRY 1: CPT

## 2019-08-21 PROCEDURE — 1036F TOBACCO NON-USER: CPT | Performed by: INTERNAL MEDICINE

## 2019-08-21 PROCEDURE — G8428 CUR MEDS NOT DOCUMENT: HCPCS | Performed by: INTERNAL MEDICINE

## 2019-08-21 PROCEDURE — 94729 DIFFUSING CAPACITY: CPT

## 2019-08-21 PROCEDURE — 94010 BREATHING CAPACITY TEST: CPT

## 2019-08-21 PROCEDURE — 99213 OFFICE O/P EST LOW 20 MIN: CPT | Performed by: INTERNAL MEDICINE

## 2019-08-21 NOTE — PROGRESS NOTES
[facility]    Outpatient Follow Up Note    CHIEF COMPLAINT : Dyspnea    HPI:  The patient is a 50 yo woman with hx of possible CREST, A1AT def, thyroid disease here for follow-up. She developed double vision, saw a neurologist and was diagnosed with probable myasthenia gravis. She has been evaluated by Dr. Ramandeep Pandey for possible [facility] after a syncopal episode though no elevation of PAP present on prior RHC or more recent echo (4/18). 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 Since last clinic visit, the patient has been doing well from respiratory perspective. She stopped taking Imuran secondary to nausea and vomiting. She has been exercising regularly with treadmill about 30 minutes a day. There are no changes to past medical history, family history, social history or review of systems(except as noted in the history section) since prior note (all reviewed with patient). Current Medications:    Current Outpatient Medications:     Calcium Carbonate-Vitamin D (CALTRATE 600+D PO), Take by mouth, Disp: , Rfl:     levothyroxine (SYNTHROID) 75 MCG tablet, Take 88 mcg by mouth Daily , Disp: , Rfl:     betamethasone dipropionate (DIPROLENE) 0.05 % ointment, Apply sparingly to affected area(s) bid prn for flares, up to 2 weeks at a time on any given area. Do not apply on cleared skin., Disp: 45 g, Rfl: 0    hydroxychloroquine (PLAQUENIL) 200 MG tablet, TAKE ONE TABLET BY MOUTH TWICE A DAY, Disp: 60 tablet, Rfl: 11    gabapentin (NEURONTIN) 100 MG capsule, Take 100 mg by mouth 2 times daily. ., Disp: , Rfl:     diclofenac sodium 1 % GEL, Apply 2 g topically 4 times daily as needed for Pain, Disp: 1 Tube, Rfl: 5    promethazine (PHENERGAN) 12.5 MG tablet, TAKE ONE TABLET BY MOUTH EVERY 6 HOURS AS NEEDED FOR NAUSEA, Disp: , Rfl:     fluticasone (FLONASE) 50 MCG/ACT nasal spray, 1 spray by Nasal route daily, Disp: , Rfl:     pyridostigmine (MESTINON) 60 low sat 97%      PFTs 8/21/19  FVC 3.18 (92%) FEV1 2.58 (94%) FEV1/FVC ratio  81%  TLC 4.42 (86%) DLCO 20.59 (87%)       IMAGING:        None new      Assessment:     1. Shortness of breath- pfts are improved  2.   Scleroderma           Plan:     - encouraged Continued regular exercise- she is doing 2 miles per day on treadmill  - f/u Dr. Karla Alcazar, dermatology  - F/u with Rheumatology and neurology and cardiology  - get PFTs again in 6 months

## 2019-08-21 NOTE — PROCEDURES
[address]                 [address] [address]                               PULMONARY FUNCTION    PATIENT NAME: Jonnathan Horta                   :        1970  MED REC NO:   [id number]                          ROOM:  ACCOUNT NO:   [de-identified]                           ADMIT DATE: 2019  PROVIDER:     Nichole Mendiola MD    DATE OF PROCEDURE:  2019    INDICATION:  Dyspnea. FINDINGS:  1. Spirometry revealed no evidence of obstructive defect. FEV1 is 2.58  liters, which is 94% of predicted. FEV1/FVC ratio of 84%. 2.  Lung volume revealed no restrictive defect. Total lung capacity of  4.42 liters, which is 86% of predicted. 3.  Diffusion capacity is normal at 20.59, which is 87% of predicted. 4.  Flow volume loops appeared to be normal.  5. In comparison with the test done on 2019, there is significant  improvement in FEV1 and FVC. CONCLUSION:  1. No evidence of obstructive defect or restrictive defect. 2.  Normal diffusion capacity. 3.  Significant improvement in FEV1 and FVC in comparison with the test  done on 2019.         Umair Jack MD    D: 2019 12:48:58       T: 2019 14:33:27     [id number]  Job#: [id number]     Doc#: [id number]    CC:

## 2019-08-23 LAB
DLCO %PRED: 87 %
DLCO PRED: NORMAL ML/MIN/MMHG
DLCO/VA %PRED: NORMAL %
DLCO/VA PRED: NORMAL ML/MIN/MMHG
DLCO/VA: NORMAL ML/MIN/MMHG
DLCO: NORMAL ML/MIN/MMHG
EXPIRATORY TIME-POST: NORMAL SEC
EXPIRATORY TIME: NORMAL SEC
FEF 25-75% %CHNG: NORMAL
FEF 25-75% %PRED-POST: NORMAL %
FEF 25-75% %PRED-PRE: NORMAL L/SEC
FEF 25-75% PRED: NORMAL L/SEC
FEF 25-75%-POST: NORMAL L/SEC
FEF 25-75%-PRE: NORMAL L/SEC
FEV1 %PRED-POST: NORMAL %
FEV1 %PRED-PRE: 94 %
FEV1 PRED: NORMAL L
FEV1-POST: NORMAL L
FEV1-PRE: NORMAL L
FEV1/FVC %PRED-POST: NORMAL %
FEV1/FVC %PRED-PRE: NORMAL %
FEV1/FVC PRED: NORMAL %
FEV1/FVC-POST: NORMAL %
FEV1/FVC-PRE: 81 %
FVC %PRED-POST: NORMAL L
FVC %PRED-PRE: NORMAL %
FVC PRED: NORMAL L
FVC-POST: NORMAL L
FVC-PRE: NORMAL L
GAW %PRED: NORMAL %
GAW PRED: NORMAL L/S/CMH2O
GAW: NORMAL L/S/CMH2O
IC %PRED: NORMAL %
IC PRED: NORMAL L
IC: NORMAL L
MEP: NORMAL
MIP: NORMAL
MVV %PRED-PRE: NORMAL %
MVV PRED: NORMAL L/MIN
MVV-PRE: NORMAL L/MIN
PEF %PRED-POST: NORMAL %
PEF %PRED-PRE: NORMAL L/SEC
PEF PRED: NORMAL L/SEC
PEF%CHNG: NORMAL
PEF-POST: NORMAL L/SEC
PEF-PRE: NORMAL L/SEC
RAW %PRED: NORMAL %
RAW PRED: NORMAL CMH2O/L/S
RAW: NORMAL CMH2O/L/S
RV %PRED: NORMAL %
RV PRED: NORMAL L
RV: NORMAL L
SVC %PRED: NORMAL %
SVC PRED: NORMAL L
SVC: NORMAL L
TLC %PRED: 86 %
TLC PRED: NORMAL L
TLC: NORMAL L
VA %PRED: NORMAL %
VA PRED: NORMAL L
VA: NORMAL L
VTG %PRED: NORMAL %
VTG PRED: NORMAL L
VTG: NORMAL L

## 2019-08-23 ASSESSMENT — PULMONARY FUNCTION TESTS
FEV1/FVC_PRE: 81
FEV1_PERCENT_PREDICTED_PRE: 94

## 2019-08-27 ENCOUNTER — OFFICE VISIT (OUTPATIENT)
Dept: RHEUMATOLOGY | Age: 49
End: 2019-08-27
Payer: COMMERCIAL

## 2019-08-27 VITALS
BODY MASS INDEX: 21.86 KG/M2 | HEART RATE: 68 BPM | SYSTOLIC BLOOD PRESSURE: 112 MMHG | WEIGHT: 123.4 LBS | DIASTOLIC BLOOD PRESSURE: 70 MMHG

## 2019-08-27 DIAGNOSIS — Z79.899 HIGH RISK MEDICATION USE: ICD-10-CM

## 2019-08-27 DIAGNOSIS — M94.1 RELAPSING POLYCHONDRITIS: ICD-10-CM

## 2019-08-27 DIAGNOSIS — M34.9 SCLERODERMA (HCC): Primary | ICD-10-CM

## 2019-08-27 PROCEDURE — 1036F TOBACCO NON-USER: CPT | Performed by: INTERNAL MEDICINE

## 2019-08-27 PROCEDURE — 99214 OFFICE O/P EST MOD 30 MIN: CPT | Performed by: INTERNAL MEDICINE

## 2019-08-27 PROCEDURE — G8420 CALC BMI NORM PARAMETERS: HCPCS | Performed by: INTERNAL MEDICINE

## 2019-08-27 PROCEDURE — G8427 DOCREV CUR MEDS BY ELIG CLIN: HCPCS | Performed by: INTERNAL MEDICINE

## 2019-08-27 RX ORDER — MYCOPHENOLIC ACID 180 MG/1
360 TABLET, DELAYED RELEASE ORAL 2 TIMES DAILY
Qty: 60 TABLET | Refills: 3 | Status: SHIPPED | OUTPATIENT
Start: 2019-08-27 | End: 2020-01-07

## 2019-08-27 RX ORDER — PREDNISONE 10 MG/1
TABLET ORAL
Qty: 40 TABLET | Refills: 0 | Status: SHIPPED | OUTPATIENT
Start: 2019-08-27 | End: 2019-09-06

## 2019-08-27 NOTE — PROGRESS NOTES
Clostridium difficile diarrhea     DNA probe positive 8/7/11    Colitis 7/2011    Salmonela positive    Diarrhea     Fibromyalgia     GERD (gastroesophageal reflux disease)     irritable bowel    Hashimoto thyroiditis     History of recurrent miscarriages     3    Incompetent cervix     3 cervical procedures. 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 one antitrypsin deficiency    Interstitial cystitis     Interstitial cystitis 6/2011    Liver disease     alpha I anti-trypsin disease    Lyme disease 2003    Mastocytosis     Peripheral neuropathy     Pregnancy in a diabetic mother     Rash     Raynaud disease     Reactive hypoglycemia     Scleroderma ([facility] [address] 2008    Thyroid dysfunction in pregnancy     Thyroiditis     Postpartum, resolved    Type II diabetes mellitus with neurological manifestations ([facility] [address]      Past Surgical History:   Procedure Laterality Date    ABLATION OF DYSRHYTHMIC FOCUS  03/2017    COLONOSCOPY  7/29/2011    CYSTOSCOPY  7/2011    DIAGNOSTIC CARDIAC CATH LAB PROCEDURE      DILATION AND CURETTAGE OF UTERUS      MOHS SURGERY Right 10/2015    VASC UPPER EXTREMITY VENOUS  UNI  01/2017    Left lower Leg    VENTRICULAR ABLATION SURGERY      VESICOVAGINAL FISTULA REPAIR  04/2017     Prior to Admission medications    Medication Sig Start Date End Date Taking? Authorizing Provider   Calcium Carbonate-Vitamin D (CALTRATE 600+D PO) Take by mouth   Yes Historical Provider, MD   levothyroxine (SYNTHROID) 75 MCG tablet Take 88 mcg by mouth Daily    Yes Historical Provider, MD   betamethasone dipropionate (DIPROLENE) 0.05 % ointment Apply sparingly to affected area(s) bid prn for flares, up to 2 weeks at a time on any given area. Do not apply on cleared skin. 1/24/19  Yes Gerry Elliott MD   hydroxychloroquine (PLAQUENIL) 200 MG tablet TAKE ONE TABLET BY MOUTH TWICE A DAY 12/26/18  Yes Valentino Camilo MD   gabapentin (NEURONTIN) 100 MG capsule Take 100 mg by mouth 2 times daily. Minor Kimberly

## 2019-08-27 NOTE — PATIENT INSTRUCTIONS
mofetil. Mycophenolate mofetil injection is given as an infusion into a vein. A healthcare provider will give you this injection. Take oral mycophenolate mofetil on an empty stomach, at least 1 hour before or 2 hours after a meal. Swallow the pill whole and do not crush, chew, break, or open it. Shake the oral suspension (liquid) before you measure a dose. Use the dosing syringe provided, or use a medicine dose-measuring device (not a kitchen spoon). Read and carefully follow any Instructions for Use provided with your medicine. Ask your doctor or pharmacist if you do not understand these instructions. Mycophenolate mofetil (CellCept) and mycophenolic acid (Myfortic) are not absorbed equally in the body. Your dose needs may change if you switch to a different brand, strength, or form of this medicine. Avoid medication errors by using only the form and strength your doctor prescribes. You will need frequent medical tests. If you've ever had hepatitis B or C, using mycophenolate mofetil can cause this virus to become active or get worse. You may need frequent liver function tests while using this medicine and for several months after you stop. Store at room temperature away from moisture and heat. Keep the bottle tightly closed when not in use. Throw away any unused liquid that is older than 60 days. The liquid medicine may also be stored in the refrigerator. Do not freeze. What happens if I miss a dose? Use the medicine as soon as you can, but skip the missed dose if it is almost time for your next dose. Do not use two doses at one time. What happens if I overdose? Seek emergency medical attention or call the Poison Help line at 1-715.128.9792. What should I avoid while using mycophenolate mofetil? You must not donate blood or sperm while using this medicine, and for at least 6 weeks (for blood) or 90 days (for sperm) after your last dose.   Do not receive a \"live\" vaccine while using mycophenolate signs of infections;  · low blood cell counts; or  · increased blood pressure or heart rate. This is not a complete list of side effects and others may occur. Call your doctor for medical advice about side effects. You may report side effects to FDA at 8-707-FDA-5880. What other drugs will affect mycophenolate mofetil? If you take sevelamer or an antacid, take your oral mycophenolate mofetil dose 2 hours before you take these other medicines. Tell your doctor about all your current medicines. Many drugs can interact with mycophenolate mofetil, especially:  · azathioprine;  · cholestyramine;  · antiviral medicine --acyclovir, ganciclovir, valacyclovir, valganciclovir;  · an antibiotic --amoxicillin, ciprofloxacin, metronidazole, norfloxacin, rifampin, sulfa drugs (SMX-TMP or SMZ-TMP, and others); or  · stomach acid reducers --esomeprazole, lansoprazole, omeprazole, Nexium, Prevacid, Prilosec, Protonix, and others. This list is not complete and many other drugs may affect mycophenolate mofetil. This includes prescription and over-the-counter medicines, vitamins, and herbal products. Not all possible drug interactions are listed here. Where can I get more information? Your doctor or pharmacist can provide more information about mycophenolate mofetil. Remember, keep this and all other medicines out of the reach of children, never share your medicines with others, and use this medication only for the indication prescribed. Every effort has been made to ensure that the information provided by [address] is accurate, up-to-date, and complete, but no guarantee is made to that effect. Drug information contained herein may be time sensitive. [employer] information has been compiled for use by healthcare practitioners and consumers in the [place] and therefore [employer] does not warrant that uses outside of the [place] are appropriate, unless specifically indicated otherwise.  [employer] drug

## 2020-01-02 NOTE — PROGRESS NOTES
no more episodes of syncope. She is following with Dr. Trish Dixon for endocrinology. On 02/11/19 she reports she had left sided neck pain. About 2.5 weeks ago while working out on the treadmill, she had left side neck pain that radiates into the temple and she couldn't hear out of her left ear. She reports since then she has nerve pain and ongoing jaw pain. She reports the sensation \"flares up\" since then. She states the hearing loss lasted 30 mins. She has been taking ibuprofen 600 mg. She states it feels vascular and feels it may rupture. Her neck is tender to palpation. She has SOB with climbing steps which is not new. She reports occasional palpitations if she squats down. She takes an aspirin 81 mg daily. Her echo from 07/02/19 showed her EF was 55%. Mild AR, MR and DE. Today she reports palpitations at times. She felt a skipping sensation with quick bursts occasionally. She reports the episodes seem random. She continues to see DR Jaquan Moser. She is seeing Dr Trish Dixon for endocrinology. Dr Julian Romero is wanting to start jardiance for elevated A1C. She is taking gabapentin to twice daily. She denies CP, SOB, fatigue or recurrent syncope.       Allergies   Allergen Reactions    Cymbalta [Duloxetine Hcl]      Swelling of face    Imuran [Azathioprine] Nausea And Vomiting    Spectracef [Cefditoren Pivoxil] Shortness Of Breath, Itching and Swelling    Bactrim Rash    Cephalosporins     Other      TREE NUTS  APPLES    Proventil [Albuterol]     Robaxin [Methocarbamol]     Tramadol Itching     Mouth Itch    Ciprofloxacin Swelling and Rash    Dilaudid [Hydromorphone] Nausea And Vomiting     Current Outpatient Medications   Medication Sig Dispense Refill    Calcium Carbonate-Vitamin D (CALTRATE 600+D PO) Take by mouth      levothyroxine (SYNTHROID) 75 MCG tablet Take 88 mcg by mouth Daily       betamethasone dipropionate (DIPROLENE) 0.05 % ointment Apply sparingly to affected area(s) bid prn for flares, up to 2 weeks at a time on any given area. Do not apply on cleared skin. 45 g 0    hydroxychloroquine (PLAQUENIL) 200 MG tablet TAKE ONE TABLET BY MOUTH TWICE A DAY 60 tablet 11    gabapentin (NEURONTIN) 100 MG capsule Take 100 mg by mouth 2 times daily. Dede Bernheim diclofenac sodium 1 % GEL Apply 2 g topically 4 times daily as needed for Pain 1 Tube 5    promethazine (PHENERGAN) 12.5 MG tablet TAKE ONE TABLET BY MOUTH EVERY 6 HOURS AS NEEDED FOR NAUSEA      fluticasone (FLONASE) 50 MCG/ACT nasal spray 1 spray by Nasal route daily      pyridostigmine (MESTINON) 60 MG tablet Take 60 mg by mouth 3 times daily       omeprazole (PRILOSEC) 20 MG capsule Take 20 mg by mouth daily      aspirin 81 MG EC tablet Take 81 mg by mouth daily.  mycophenolate (MYFORTIC) 180 MG DR tablet Take 2 tablets by mouth 2 times daily (Patient not taking: Reported on 1/7/2020) 60 tablet 3     No current facility-administered medications for this visit. Past Medical History:   Diagnosis Date    Alpha-1-antitrypsin deficiency ([address]     Blood circulation, collateral rynaud's disease    Cardiac arrhythmia     Clostridium difficile diarrhea     DNA probe positive 8/7/11    Colitis 7/2011    Salmonela positive    Diarrhea     Fibromyalgia     GERD (gastroesophageal reflux disease)     irritable bowel    Hashimoto thyroiditis     History of recurrent miscarriages     3    Incompetent cervix     3 cervical procedures.  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 one antitrypsin deficiency    Interstitial cystitis     Interstitial cystitis 6/2011    Liver disease     alpha I anti-trypsin disease    Lyme disease 2003    Mastocytosis     Peripheral neuropathy     Pregnancy in a diabetic mother     Rash     Raynaud disease     Reactive hypoglycemia     Scleroderma ([address] 2008    Thyroid dysfunction in pregnancy     Thyroiditis     Postpartum, resolved    Type II diabetes mellitus with neurological manifestations [facility])      Past Surgical History:   Procedure Laterality Date    ABLATION OF DYSRHYTHMIC FOCUS  03/2017    COLONOSCOPY  7/29/2011    CYSTOSCOPY  7/2011    DIAGNOSTIC CARDIAC CATH LAB PROCEDURE      DILATION AND CURETTAGE OF UTERUS      MOHS SURGERY Right 10/2015    VASC UPPER EXTREMITY VENOUS  UNI  01/2017    Left lower Leg    VENTRICULAR ABLATION SURGERY      VESICOVAGINAL FISTULA REPAIR  04/2017     Family History   Problem Relation Age of Onset    Cancer Maternal Grandfather         colon cancer    Thyroid Disease Mother     Diabetes Maternal Grandmother     Heart Disease Maternal Grandmother     Thyroid Disease Maternal Grandmother     Cancer Maternal Grandmother         melanoma    Other Daughter         connective tissues disease    Cancer Maternal Uncle         melanoma    High Cholesterol Neg Hx     High Blood Pressure Neg Hx      Social History     Socioeconomic History    Marital status:      Spouse name: Not on file    Number of children: Not on file    Years of education: Not on file    Highest education level: Not on file   Occupational History    Not on file   Social Needs    Financial resource strain: Not on file    Food insecurity:     Worry: Not on file     Inability: Not on file    Transportation needs:     Medical: Not on file     Non-medical: Not on file   Tobacco Use    Smoking status: Never Smoker    Smokeless tobacco: Never Used   Substance and Sexual Activity    Alcohol use: No     Alcohol/week: 0.0 standard drinks    Drug use: No    Sexual activity: Not on file   Lifestyle    Physical activity:     Days per week: Not on file     Minutes per session: Not on file    Stress: Not on file   Relationships    Social connections:     Talks on phone: Not on file     Gets together: Not on file     Attends Denominational service: Not on file     Active member of club or organization: Not on file     Attends meetings of clubs or organizations: Not on 10/09/18: , TG 51, HDL 82, LDL 93    Labs were reviewed including labs from other hospital systems through [facility]. Cardiac testing was reviewed including echos, nuclear scans, cardiac catheterization, including from other hospital systems through [facility]. Assessment:  1. PAH (pulmonary artery hypertension) ([facility]    2. Scleroderma ([facility]    3. SOB (shortness of breath)    4. Palpitations      She does not appear to have any evidence of PAH. Continue surveillance      Plan:    1. [id number] from a cardiac standpoint to try jardiance  2. Echo in 6 months  3. Follow up in 6 months        QUALITY MEASURES  1. Tobacco Cessation Counseling: NA  2. Retake of BP if >140/90:   NA  3. Documentation to PCP/referring for new patient:  Sent to PCP at close of office visit  4. CAD patient on anti-platelet: NA  5. CAD patient on STATIN therapy:  NA  6. Patient with CHF and aFib on anticoagulation:  NA     Scribe's attestation: This note was scribed in the presence of Brad Santiago M.D. By Claudetta Gaskins RN     The scribe's documentation has been prepared under my direction and personally reviewed by me in its entirety. I confirm that the note above accurately reflects all work, treatment, procedures, and medical decision making performed by me. I appreciate the opportunity of cooperating in the care of this patient.     Brad Santiago M.D., [facility] [place]

## 2020-01-07 ENCOUNTER — OFFICE VISIT (OUTPATIENT)
Dept: CARDIOLOGY CLINIC | Age: 50
End: 2020-01-07
Payer: COMMERCIAL

## 2020-01-07 VITALS
WEIGHT: 123 LBS | HEART RATE: 85 BPM | DIASTOLIC BLOOD PRESSURE: 70 MMHG | HEIGHT: 63 IN | BODY MASS INDEX: 21.79 KG/M2 | SYSTOLIC BLOOD PRESSURE: 118 MMHG | OXYGEN SATURATION: 91 %

## 2020-01-07 PROCEDURE — 1036F TOBACCO NON-USER: CPT | Performed by: INTERNAL MEDICINE

## 2020-01-07 PROCEDURE — G8420 CALC BMI NORM PARAMETERS: HCPCS | Performed by: INTERNAL MEDICINE

## 2020-01-07 PROCEDURE — G8484 FLU IMMUNIZE NO ADMIN: HCPCS | Performed by: INTERNAL MEDICINE

## 2020-01-07 PROCEDURE — 99214 OFFICE O/P EST MOD 30 MIN: CPT | Performed by: INTERNAL MEDICINE

## 2020-01-07 PROCEDURE — G8427 DOCREV CUR MEDS BY ELIG CLIN: HCPCS | Performed by: INTERNAL MEDICINE

## 2020-01-07 NOTE — PATIENT INSTRUCTIONS
Plan:    1. [id number] from a cardiac standpoint to try jardiance  2. Echo in 6 months  3. Follow up in 6 months    Your provider has ordered testing for further evaluation. An order/prescription has been included in your paper work.  Central Schedule should contact you within three business days to schedule the test or you can contact Central Scheduling sooner by calling [facility].  If Central scheduling does not contact within three business days, please call to schedule the test by calling [facility]. ([phone])

## 2020-01-23 NOTE — PROGRESS NOTES
Diarrhea     Fibromyalgia     GERD (gastroesophageal reflux disease)     irritable bowel    Hashimoto thyroiditis     History of recurrent miscarriages     3    Incompetent cervix     3 cervical procedures.  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 one antitrypsin deficiency    Interstitial cystitis     Interstitial cystitis 6/2011    Liver disease     alpha I anti-trypsin disease    Lyme disease 2003    Mastocytosis     Peripheral neuropathy     Pregnancy in a diabetic mother     Rash     Raynaud disease     Reactive hypoglycemia     Scleroderma ([facility] [address] 2008    Thyroid dysfunction in pregnancy     Thyroiditis     Postpartum, resolved    Type II diabetes mellitus with neurological manifestations ([facility] [address]      Past Surgical History:   Procedure Laterality Date    ABLATION OF DYSRHYTHMIC FOCUS  03/2017    COLONOSCOPY  7/29/2011    CYSTOSCOPY  7/2011    DIAGNOSTIC CARDIAC CATH LAB PROCEDURE      DILATION AND CURETTAGE OF UTERUS      MOHS SURGERY Right 10/2015    VASC UPPER EXTREMITY VENOUS  UNI  01/2017    Left lower Leg    VENTRICULAR ABLATION SURGERY      VESICOVAGINAL FISTULA REPAIR  04/2017       Allergies   Allergen Reactions    Cymbalta [Duloxetine Hcl]      Swelling of face    Imuran [Azathioprine] Nausea And Vomiting    Spectracef [Cefditoren Pivoxil] Shortness Of Breath, Itching and Swelling    Bactrim Rash    Cephalosporins     Other      TREE NUTS  APPLES    Proventil [Albuterol]     Robaxin [Methocarbamol]     Tramadol Itching     Mouth Itch    Ciprofloxacin Swelling and Rash    Dilaudid [Hydromorphone] Nausea And Vomiting     Outpatient Medications Marked as Taking for the 1/30/20 encounter (Office Visit) with Mel Moncada MD   Medication Sig Dispense Refill    Calcium Carbonate-Vitamin D (CALTRATE 600+D PO) Take by mouth      levothyroxine (SYNTHROID) 75 MCG tablet Take 88 mcg by mouth Daily       betamethasone dipropionate (DIPROLENE) 0.05 % ointment Apply sparingly to affected area(s) bid prn for flares, up to 2 weeks at a time on any given area. Do not apply on cleared skin. 45 g 0    hydroxychloroquine (PLAQUENIL) 200 MG tablet TAKE ONE TABLET BY MOUTH TWICE A DAY 60 tablet 11    gabapentin (NEURONTIN) 100 MG capsule Take 100 mg by mouth 2 times daily. Ancil Rodrick diclofenac sodium 1 % GEL Apply 2 g topically 4 times daily as needed for Pain 1 Tube 5    promethazine (PHENERGAN) 12.5 MG tablet TAKE ONE TABLET BY MOUTH EVERY 6 HOURS AS NEEDED FOR NAUSEA      fluticasone (FLONASE) 50 MCG/ACT nasal spray 1 spray by Nasal route daily      pyridostigmine (MESTINON) 60 MG tablet Take 60 mg by mouth 3 times daily       omeprazole (PRILOSEC) 20 MG capsule Take 20 mg by mouth daily      aspirin 81 MG EC tablet Take 81 mg by mouth daily. Physical Examination     The following were examined and determined to be normal: Psych/Neuro, Scalp/hair, Conjunctivae/eyelids, Gums/teeth/lips, Neck, Nails/digits and Genitalia/groin/buttocks. The following were examined and determined to be abnormal: Head/face, Breast/axilla/chest, Abdomen, Back, RUE, LUE, RLE and LLE. -General: Well-appearing, NAD  1. R cheek - scar clear  2. Scattered on the face, trunk and extremities are multiple well-defined round and oval symmetric smoothly-bordered uniformly brown macules and papules. 2a. L medial/anterior thigh - 5mm asymmetric medium/dark brown macule     2b. L ala - few mm round symmetric soft skin-colored papule   3. R temple 1, R nasal bridge 1  - ill-defined irregularly-shaped roughly-scaling thin pink macule(s)/papule(s)   4 . Midline lower back - dome-shaped bright red papule   5. L upper chest - ill-defined faintly erythematous patches     Assessment and Plan     1.  History of NMSC - clear today  -Reviewed sun protective behavior -- sun avoidance during the peak hours of the day, sun-protective clothing (including hat and sunglasses), sunscreen use (water resistant, broad spectrum, SPF at least 30, need for reapplication every 2 to 3 hours), avoidance of tanning beds  -Full skin exam in 1 year (sooner if indicated)     2. Benign acquired melanocytic nevi / family hx melanoma  -Recommend monthly self skin exams   -Educated regarding the ABCDEs of melanoma detection   -Call for any new/changing moles or concerning lesions    2a. Neoplasm of uncertain behavior of skin - R/o dysplastic nevus, L medial/anterior thigh  -Discussed possible diagnosis. Patient agreeable to biopsy. Verbal consent obtained after risks (infection, bleeding, scar), benefits and alternatives explained. -Area(s) to be biopsied were marked with a surgical pen. Site(s) were cleansed with alcohol. Local anesthesia achieved with 1% lidocaine with epinephrine/sodium bicarbonate. Shave biopsy performed with a razor blade. Hemostasis was achieved with aluminum chloride. The wound(s) were dressed with petrolatum and covered with a bandage. Specimen(s) sent to pathology. Pt educated re: risk of bleeding, infection, scar and wound care instructions. 2b. Dermal nevus, L ala   -Reassurance re: benign clinical features     3. Actinic keratosis(es)  -Edu re: relationship with chronic cumulative sun exposure, low premalignant potential.   -2 lesion(s) treated w/ liquid nitrogen x 2 cycles - R temple 1, R nasal bridge 1. Edu re: risk of blister formation, discomfort, scar, hypopigmentation. Discussed wound care. 4. Cherry angioma  -Reassurance re: benignity   -Offered punch excision given associated symptoms but pt declined     5. Pruritus of skin overlying L chest scleroderma  -Eucrisa oint bid prn flares. Sampled. Okay to alternate every 2 weeks w/ lidex oint. Pt will call for prescription if desired.

## 2020-01-30 ENCOUNTER — OFFICE VISIT (OUTPATIENT)
Dept: DERMATOLOGY | Age: 50
End: 2020-01-30
Payer: COMMERCIAL

## 2020-01-30 PROCEDURE — G8427 DOCREV CUR MEDS BY ELIG CLIN: HCPCS | Performed by: DERMATOLOGY

## 2020-01-30 PROCEDURE — G8420 CALC BMI NORM PARAMETERS: HCPCS | Performed by: DERMATOLOGY

## 2020-01-30 PROCEDURE — 1036F TOBACCO NON-USER: CPT | Performed by: DERMATOLOGY

## 2020-01-30 PROCEDURE — 17003 DESTRUCT PREMALG LES 2-14: CPT | Performed by: DERMATOLOGY

## 2020-01-30 PROCEDURE — 17000 DESTRUCT PREMALG LESION: CPT | Performed by: DERMATOLOGY

## 2020-01-30 PROCEDURE — 11102 TANGNTL BX SKIN SINGLE LES: CPT | Performed by: DERMATOLOGY

## 2020-01-30 PROCEDURE — G8484 FLU IMMUNIZE NO ADMIN: HCPCS | Performed by: DERMATOLOGY

## 2020-01-30 PROCEDURE — 99214 OFFICE O/P EST MOD 30 MIN: CPT | Performed by: DERMATOLOGY

## 2020-02-03 ENCOUNTER — TELEPHONE (OUTPATIENT)
Dept: DERMATOLOGY | Age: 50
End: 2020-02-03

## 2020-02-03 LAB — DERMATOLOGY PATHOLOGY REPORT: NORMAL

## 2020-02-03 NOTE — TELEPHONE ENCOUNTER
----- Message from Suzanne Choi MD sent at 2/3/2020  4:00 PM EST -----  Atypical mole. Excised via biopsy, so no treatment needed. Atypical moles are risk factors for melanoma.

## 2020-07-01 ENCOUNTER — HOSPITAL ENCOUNTER (OUTPATIENT)
Dept: CARDIOLOGY | Age: 50
Discharge: HOME | End: 2020-07-01
Payer: MEDICARE

## 2020-07-01 LAB
LV EF: 55 %
LVEF MODALITY: NORMAL

## 2020-07-01 PROCEDURE — 93306 TTE W/DOPPLER COMPLETE: CPT

## 2020-08-17 ENCOUNTER — TELEPHONE (OUTPATIENT)
Dept: PULMONOLOGY | Age: 50
End: 2020-08-17

## 2020-08-17 NOTE — TELEPHONE ENCOUNTER
limited to the following:    Your Provider(s) may not able to provide medical treatment for your particular condition and you may be required to seek alternative healthcare or emergency care services.  The electronic systems or other security protocols or safeguards used in the Service could fail, causing a breach of privacy of your medical or other information.  Given regulatory requirements in certain jurisdictions, your Provider(s) diagnosis and/or treatment options, especially pertaining to certain prescriptions, may be limited. Acceptance   1. You understand that Services will be provided via Telehealth. This process involves the use of HIPAA compliant and secure, real-time audio-visual interfacing with a qualified and appropriately trained provider located at [facility]. 2. You understand that, under no circumstances, will this session be recorded. 3. You understand that the Provider(s) at [facility] and other clinical participants will be party to the information obtained during the Telehealth session in accordance with best medical practices. 4. You understand that the information obtained during the Telehealth session will be used to help determine the most appropriate treatment options. 5. You understand that You have the right to revoke this consent at any point in time. 6. You understand that Telehealth is voluntary, and that continued treatment is not dependent upon consent. 7. You understand that, in the event of non-consent to Telehealth services and/or technical difficulties, you will obtain services as typically provided in the absence of Telehealth technology. 8. You understand that this consent will be kept in Your medical record. 9. No potential benefits from the use of Telehealth or specific results can be guaranteed. Your condition may not be cured or improved and, in some cases, may get worse.    10. There are limitations in the provision of medical care and treatment via Telehealth and the Service and you may not be able to receive diagnosis and/or treatment through the Service for every condition for which you seek diagnosis and/or treatment. 11. There are potential risks to the use of Telehealth, including but not limited to the risks described in this Telehealth Consent. 12. Your Provider(s) have discussed the use of Telehealth and the Service with you, including the benefits and risks of such and you have provided oral consent to your Provider(s) for the use of Telehealth and the Service. 15. You understand that it is your duty to provide your Provider(s) truthful, accurate and complete information, including all relevant information regarding care that you may have received or may be receiving from other healthcare providers outside of the Service. 14. You understand that each of your Provider(s) may determine in his or sole discretion that your condition is not suitable for diagnosis and/or treatment using the Service, and that you may need to seek medical care and treatment a specialist or other healthcare provider, outside of the Service. 15. You understand that you are fully responsible for payment for all services provided by Provider(s) or through use of the Service and that you may not be able to use third-party insurance. 16. You represent that (a) you have read this Telehealth Consent carefully, (b) you understand the risks and benefits of the Service and the use of Telehealth in the medical care and treatment provided to you by Provider(s) using the Service, and (c) you have the legal capacity and authority to provide this consent for yourself and/or the minor for which you are consenting under applicable federal and state laws, including laws relating to the age of [de-identified] and/or parental/guardian consent.    17. You give your informed consent to the use of Telehealth by Provider(s) using the Service under the terms described in the Terms of Service and this Telehealth Consent. The patient was read the following statement and has consented to the visit as of 8/17/20. The patient has been scheduled for their first telehealth visit on 8/24/20 with Dr. Anna Olivarez.

## 2020-08-18 ENCOUNTER — OFFICE VISIT (OUTPATIENT)
Dept: PRIMARY CARE CLINIC | Age: 50
End: 2020-08-18
Payer: COMMERCIAL

## 2020-08-18 PROCEDURE — G8428 CUR MEDS NOT DOCUMENT: HCPCS | Performed by: NURSE PRACTITIONER

## 2020-08-18 PROCEDURE — G8420 CALC BMI NORM PARAMETERS: HCPCS | Performed by: NURSE PRACTITIONER

## 2020-08-18 PROCEDURE — 99211 OFF/OP EST MAY X REQ PHY/QHP: CPT | Performed by: NURSE PRACTITIONER

## 2020-08-18 NOTE — PROGRESS NOTES
Thea Chanelarnold Abel received a viral test for COVID-19. They were educated on isolation and quarantine as appropriate. For any symptoms, they were directed to seek care from their PCP, given contact information to establish with a doctor, directed to an urgent care or the emergency room.

## 2020-08-18 NOTE — PATIENT INSTRUCTIONS
Advance Care Planning  People with COVID-19 may have no symptoms, mild symptoms, such as fever, cough, and shortness of breath or they may have more severe illness, developing severe and fatal pneumonia. As a result, Advance Care Planning with attention to naming a health care decision maker (someone you trust to make healthcare decisions for you if you could not speak for yourself) and sharing other health care preferences is important BEFORE a possible health crisis. Please contact your Primary Care Provider to discuss Advance Care Planning. Preventing the Spread of Coronavirus Disease 2019 in Homes and Residential Communities  For the most recent information go to [email or web address]    Prevention steps for People with confirmed or suspected COVID-19 (including persons under investigation) who do not need to be hospitalized  and   People with confirmed COVID-19 who were hospitalized and determined to be medically stable to go home    Your healthcare provider and public health staff will evaluate whether you can be cared for at home. If it is determined that you do not need to be hospitalized and can be isolated at home, you will be monitored by staff from your local or state health department. You should follow the prevention steps below until a healthcare provider or local or state health department says you can return to your normal activities. Stay home except to get medical care  People who are mildly ill with COVID-19 are able to isolate at home during their illness. You should restrict activities outside your home, except for getting medical care. Do not go to work, school, or public areas. Avoid using public transportation, ride-sharing, or taxis. Separate yourself from other people and animals in your home  People: As much as possible, you should stay in a specific room and away from other people in your home.  Also, you should use a separate before eating or preparing food. If soap and water are not readily available, use an alcohol-based hand  with at least 60% alcohol, covering all surfaces of your hands and rubbing them together until they feel dry. Soap and water are the best option if hands are visibly dirty. Avoid touching your eyes, nose, and mouth with unwashed hands. Avoid sharing personal household items  You should not share dishes, drinking glasses, cups, eating utensils, towels, or bedding with other people or pets in your home. After using these items, they should be washed thoroughly with soap and water. Clean all high-touch surfaces everyday  High touch surfaces include counters, tabletops, doorknobs, bathroom fixtures, toilets, phones, keyboards, tablets, and bedside tables. Also, clean any surfaces that may have blood, stool, or body fluids on them. Use a household cleaning spray or wipe, according to the label instructions. Labels contain instructions for safe and effective use of the cleaning product including precautions you should take when applying the product, such as wearing gloves and making sure you have good ventilation during use of the product. Monitor your symptoms  Seek prompt medical attention if your illness is worsening (e.g., difficulty breathing). Before seeking care, call your healthcare provider and tell them that you have, or are being evaluated for, COVID-19. Put on a facemask before you enter the facility. These steps will help the healthcare providers office to keep other people in the office or waiting room from getting infected or exposed. Ask your healthcare provider to call the local or state health department. Persons who are placed under active monitoring or facilitated self-monitoring should follow instructions provided by their local health department or occupational health professionals, as appropriate. When working with your local health department check their available hours.   If you have a medical emergency and need to call 911, notify the dispatch personnel that you have, or are being evaluated for COVID-19. If possible, put on a facemask before emergency medical services arrive. Discontinuing home isolation  Patients with confirmed COVID-19 should remain under home isolation precautions until the risk of secondary transmission to others is thought to be low. The decision to discontinue home isolation precautions should be made on a case-by-case basis, in consultation with healthcare providers and state and local health departments.

## 2020-08-19 LAB — SARS-COV-2, NAA: NOT DETECTED

## 2020-08-24 ENCOUNTER — TELEPHONE (OUTPATIENT)
Dept: PULMONOLOGY | Age: 50
End: 2020-08-24

## 2020-08-24 ENCOUNTER — VIRTUAL VISIT (OUTPATIENT)
Dept: PULMONOLOGY | Age: 50
End: 2020-08-24
Payer: MEDICARE

## 2020-08-24 PROCEDURE — G8427 DOCREV CUR MEDS BY ELIG CLIN: HCPCS | Performed by: INTERNAL MEDICINE

## 2020-08-24 PROCEDURE — 99213 OFFICE O/P EST LOW 20 MIN: CPT | Performed by: INTERNAL MEDICINE

## 2020-08-24 NOTE — TELEPHONE ENCOUNTER
Current order for PFT is . New order Pended. Assessment: 19      1. Shortness of breath- pfts are improved  2.   Scleroderma              Plan:      - encouraged Continued regular exercise- she is doing 2 miles per day on treadmill  - f/u Dr. Babita Walker, dermatology  - F/u with Rheumatology and neurology and cardiology  - get PFTs again in 6 months

## 2020-08-24 NOTE — PROGRESS NOTES
positive item  \"[]\" Indicates a negative item  -- DELETE ALL ITEMS NOT EXAMINED]  Vital Signs: (As obtained by patient/caregiver or practitioner observation)    Blood pressure-  Heart rate-    Respiratory rate-    Temperature-  Pulse oximetry-     Constitutional: [x] Appears well-developed and well-nourished [x] No apparent distress      [] Abnormal-   Mental status  [] Alert and awake  [x] Oriented to person/place/time [x]Able to follow commands      Eyes:  EOM    [x]  Normal  [] Abnormal-  Sclera  [x]  Normal  [] Abnormal -         Discharge [x]  None visible  [] Abnormal -    HENT:   [x] Normocephalic, atraumatic. [] Abnormal   [x] Mouth/Throat: Mucous membranes are moist.     External Ears [x] Normal  [] Abnormal-     Neck: [] No visualized mass     Pulmonary/Chest: [x] Respiratory effort normal.  [x] No visualized signs of difficulty breathing or respiratory distress        [] Abnormal-      Musculoskeletal:   [] Normal gait with no signs of ataxia         [x] Normal range of motion of neck        [] Abnormal-       Neurological:        [x] No Facial Asymmetry (Cranial nerve 7 motor function) (limited exam to video visit)          [x] No gaze palsy        [] Abnormal-         Skin:        [] No significant exanthematous lesions or discoloration noted on facial skin         [] Abnormal-            Psychiatric:       [x] Normal Affect [x] No Hallucinations        [] Abnormal-       PFTs 2/20/19  FVC 2.86 (82%) FEV1 2.01 (72%) FEV1/FVC ratio  70%     TLC 4.38 (85%) DLCO 21.24 (89%)  6mwt 1420 feet, low sat 97%        PFTs 8/21/19  FVC 3.18 (92%) FEV1 2.58 (94%) FEV1/FVC ratio  81%  TLC 4.42 (86%) DLCO 20.59 (87%)       PFTs 8/24/20  FVC 3.53 (103%) FEV1 2.94 (%108) FEV1/FVC ratio 83% TLC 4.73 (93%) DLCO 21.35 (90%)            Assessment:      1. Shortness of breath- pfts are improved  2.   Scleroderma              Plan:      - encouraged Continued regular exercise- she is doing 2 miles per day on treadmill  - f/u  [employer], dermatology  - F/u with Rheumatology and neurology and cardiology  - get PFTs again in 12 months      Orders  - PFTs in 12 months  - f/u after PFts    There are no diagnoses linked to this encounter. No follow-ups on file. Miley Yates is a 53 y.o. female being evaluated by a Virtual Visit (video visit) encounter to address concerns as mentioned above. A caregiver was present when appropriate. Due to this being a TeleHealth encounter (During EJNSN-14 public health emergency), evaluation of the following organ systems was limited: Vitals/Constitutional/EENT/Resp/CV/GI//MS/Neuro/Skin/Heme-Lymph-Imm. Pursuant to the emergency declaration under the [address] [place] [address], [address] [place] [address] authority and the [employer] and [employer] Act, this Virtual Visit was conducted with patient's (and/or legal guardian's) consent, to reduce the patient's risk of exposure to COVID-19 and provide necessary medical care. The patient (and/or legal guardian) has also been advised to contact this office for worsening conditions or problems, and seek emergency medical treatment and/or call 911 if deemed necessary. Patient identification was verified at the start of the visit: Yes    Total time spent on this encounter: Not billed by time    Services were provided through a video synchronous discussion virtually to substitute for in-person clinic visit. Patient and provider were located at their individual homes. --Austin Robert MD on 8/24/2020 at 1:02 PM    An electronic signature was used to authenticate this note.

## 2020-08-28 NOTE — PROGRESS NOTES
[address]   Advanced Heart Failure/Pulmonary Hypertension  Cardiac Evaluation      Alvina Torres  YOB: 1970    Date of Visit:  9/1/20    Chief Complaint   Patient presents with    Follow-up    Irregular Heart Beat        History of Present Illness:   Alvina Torres is a 53 y.o. female was seen at the request of Dr. Eun Hdez for consultation of [facility]. She was seen by Dr. Sara Bhatia in the past for her [facility] but wants to switch to me. She has a complex history of PAH, Scleroderma, Alpha 1 antitrypsin deficiency, Raynauds and arthritis. She had thyroid dysfunction and diabetes with her pregnancy. She also has cervical stenosis. She is a mother of 5 children. She sees Dr. Chad Montero yearly for scleroderma and gets yearly CT scans. She sees endocrinologist for thyroid problems and diabetes. Was having ptosis on her eye with double vision and was started on high dose prednisone. She wore a 24 hour monitor but no racing heart beats at the time she was wearing it. She was seen by EPO years ago and has tried a medication name unknown to slow the HR but her BP went too low and the medication was stopped. Family history includes GM with heart disease at age [de-identified]. Her other GM has a pacemaker. She underwent R & LHC on 6/1/2016 that revealed no CAD and no PHTN. She has been following with Dr Chad Montero for scleroderma. She underwent an SVT ablation on 3/15/17. She wore a 48 hour holter monitor. Her preliminary echo from 4/11/17 shows normal function. On 4/5/18 she was sitting at her kitchen bench and fainted. She states she does not remember going down. She states this has never happened before. She wore a 48 hour holter monitor 4/13/18-4/18/18 which was within normal limits. She states she is getting a ultrasound of her thyroid on 4/18/18 and lab work. She had an Echo 04/17/18 showed EF of  60%. She wore a AGNES from 5/29-6/27/18 that showed PAC's and PVC\"S.   She states she has had no more episodes of syncope. She is following with Dr. Randa Lucio for endocrinology. On 02/11/19 she reports she had left sided neck pain. About 2.5 weeks prior while working out on the treadmill, she had left side neck pain that radiates into the temple and she couldn't hear out of her left ear. She reports since then she has nerve pain and ongoing jaw pain. She reports the sensation \"flares up\" since then. She states the hearing loss lasted 30 mins. She has been taking ibuprofen 600 mg. She states it feels vascular and feels it may rupture. Her neck is tender to palpation. She has SOB with climbing steps which is not new. She reports occasional palpitations if she squats down. She takes an aspirin 81 mg daily. Her echo from 07/02/19 showed her EF was 55%. Mild AR, MR and VA. Her echo from 07/01/20 showed her EF was 55%. Mild MR, TR, and AR. SPAP 28 mmHg. Today she reports her treadmill broke for 2 months and was not able to exercise until 07/2020. She reports she is having palpitations again. She had a severe episode on 08/31/20 that was sudden onset. The episodes are getting stronger and more frequent. She reports she is having vascular issues in her legs with bruising and redness. Her toes on her left foot change colors to almost black. She reports her legs and feet are tender to touch. She reports her skin in her upper legs gets hot. She denies CP, SOB, dizziness or syncope.  She needs a new endocrinologist.        Allergies   Allergen Reactions    Cymbalta [Duloxetine Hcl]      Swelling of face    Imuran [Azathioprine] Nausea And Vomiting    Iron Supplement [Ferrous Sulfate] Shortness Of Breath     thinks it was from an additive    Spectracef [Cefditoren Pivoxil] Shortness Of Breath, Itching and Swelling    Bactrim Rash    Cephalosporins     Other      TREE NUTS  APPLES    Proventil [Albuterol]     Robaxin [Methocarbamol]     Tramadol Itching     Mouth Itch    Ciprofloxacin Swelling and Rash    Talks on phone: Not on file     Gets together: Not on file     Attends Protestant service: Not on file     Active member of club or organization: Not on file     Attends meetings of clubs or organizations: Not on file     Relationship status: Not on file    Intimate partner violence     Fear of current or ex partner: Not on file     Emotionally abused: Not on file     Physically abused: Not on file     Forced sexual activity: Not on file   Other Topics Concern    Not on file   Social History Narrative    Not on file       Review of Systems:   · Constitutional: there has been no unanticipated weight loss. There's been no change in energy level, sleep pattern, or activity level. · Eyes: No visual changes or diplopia. No scleral icterus. · ENT: No Headaches, hearing loss or vertigo. No mouth sores or sore throat. · Cardiovascular: Reviewed in HPI  · Respiratory: No cough or wheezing, no sputum production. No hematemesis. · Gastrointestinal: No abdominal pain, appetite loss, blood in stools. No change in bowel or bladder habits. · Genitourinary: No dysuria, trouble voiding, or hematuria. · Musculoskeletal:  No gait disturbance, weakness or joint complaints. · Integumentary: No rash or pruritis. · Neurological: No headache, diplopia, change in muscle strength, numbness or tingling. No change in gait, balance, coordination, mood, affect, memory, mentation, behavior. · Psychiatric: No anxiety, no depression. · Endocrine: No malaise, fatigue or temperature intolerance. No excessive thirst, fluid intake, or urination. No tremor. · Hematologic/Lymphatic: No abnormal bruising or bleeding, blood clots or swollen lymph nodes. · Allergic/Immunologic: No nasal congestion or hives. Physical Examination:    Vitals:    09/01/20 1050   BP: 120/70   Pulse: 82   SpO2: 99%   Weight: 131 lb (59.4 kg)   Height: 5' 3\" (1.6 m)     Body mass index is 23.21 kg/m².      Wt Readings from Last 3 Encounters:   09/01/20 131 lb (59.4 kg)   01/07/20 123 lb (55.8 kg)   08/27/19 123 lb 6.4 oz (56 kg)     BP Readings from Last 3 Encounters:   09/01/20 120/70   01/07/20 118/70   08/27/19 112/70              Constitutional and General Appearance:   WD/WN in NAD  HEENT:  NC/AT  Skin:  Warm, dry  Respiratory:  · Normal excursion and expansion without use of accessory muscles  · Resp Auscultation: Normal breath sounds without dullness  Cardiovascular:  · The apical impulses not displaced  · Heart tones are crisp and normal  · Cervical veins are not engorged  · The carotid upstroke is normal in amplitude and contour without delay or bruit  · JVP normal  RRR with nl S1 and S2 without m,r,g  · Peripheral pulses are symmetrical and full  · There is no clubbing, cyanosis of the extremities. · No edema  · Femoral Arteries: 2+ and equal  · Pedal Pulses: 2+ and equal   Neck:  · JVP less than 8 cm H2O  · No thyromegaly  Abdomen:  · No masses or tenderness  · Liver/Spleen: No Abnormalities Noted  Neurological/Psychiatric:  · Alert and oriented in all spheres  · Moves all extremities well  · Exhibits normal gait balance and coordination  · No abnormalities of mood, affect, memory, mentation, or behavior are noted          CATH: 6/1/2016  PA 18/6 TZ-by echo RVSP 47-4/16  ~Findings  Normal pulmonary pressures and outputs  Normal coronary arteries   Recommendation  ~Aggressive medical treatment and risk factor modification      GXT Myoview-4/19/2016  Normal LV size and systolic function. Left ventricular ejection fraction of   70 %. Normal wall motion. There is a small anterolateral defect that is   present at rest and worse at stress concerning for ischemia. However cannot   rule out artifact from breast attenuation and increased bowel uptake of   radio tracer. Echo: 07/02/19  Normal left ventricle systolic function with an estimated ejection fraction   of 55%. No regional wall motion abnormalities are seen.    Normal left ventricular diastolic filling pressure. Mild aortic regurgitation. Mild mitral and tricuspid regurgitation. Systolic pulmonary artery pressure (SPAP) is normal and estimated at 23 mmHg   (right atrial pressure 3 mmHg). Echo: 07/01/20  Summary   Left ventricular systolic function is normal with a visually estimated   ejection fraction of 55%. No regional wall motion abnormalities are noted. Normal left ventricular diastolic function. Mild mitral, tricuspid and aortic regurgitation. Systolic pulmonary artery pressure (SPAP) is normal and estimated at 28 mmHg   (right atrial pressure 3 mmHg). Lipids: 10/09/18: , TG 51, HDL 82, LDL 93    Labs were reviewed including labs from other hospital systems through [facility]. Cardiac testing was reviewed including echos, nuclear scans, cardiac catheterization, including from other hospital systems through [facility]. Assessment:  1. PAH (pulmonary artery hypertension) (HCC)    2. Scleroderma (HCC)    3. Palpitations    4. SOB (shortness of breath)          She does not appear to have any evidence of PAH. Continue surveillance      Plan:    1. 30 day event monitor for persistent palpitations. If having atrial tachycardia, will need to see EP again,  2. Follow up in 6 months      QUALITY MEASURES  1. Tobacco Cessation Counseling: NA  2. Retake of BP if >140/90:   NA  3. Documentation to PCP/referring for new patient:  Sent to PCP at close of office visit  4. CAD patient on anti-platelet: NA  5. CAD patient on STATIN therapy:  NA  6. Patient with CHF and aFib on anticoagulation:  NA     Chandaibe's attestation: This note was scribed in the presence of Chapincito Mckinney M.D. By Medardo Ferreira RN     The dotty's documentation has been prepared under my direction and personally reviewed by me in its entirety. I confirm that the note above accurately reflects all work, treatment, procedures, and medical decision making performed by me.       I appreciate the opportunity of cooperating in the care of this patient.     Tiff Israel M.D., [facility]

## 2020-09-01 ENCOUNTER — OFFICE VISIT (OUTPATIENT)
Dept: CARDIOLOGY CLINIC | Age: 50
End: 2020-09-01
Payer: MEDICARE

## 2020-09-01 ENCOUNTER — TELEPHONE (OUTPATIENT)
Dept: CARDIOLOGY CLINIC | Age: 50
End: 2020-09-01

## 2020-09-01 VITALS
SYSTOLIC BLOOD PRESSURE: 120 MMHG | WEIGHT: 131 LBS | DIASTOLIC BLOOD PRESSURE: 70 MMHG | HEART RATE: 82 BPM | BODY MASS INDEX: 23.21 KG/M2 | OXYGEN SATURATION: 99 % | HEIGHT: 63 IN

## 2020-09-01 PROCEDURE — 1036F TOBACCO NON-USER: CPT | Performed by: INTERNAL MEDICINE

## 2020-09-01 PROCEDURE — G8420 CALC BMI NORM PARAMETERS: HCPCS | Performed by: INTERNAL MEDICINE

## 2020-09-01 PROCEDURE — G8427 DOCREV CUR MEDS BY ELIG CLIN: HCPCS | Performed by: INTERNAL MEDICINE

## 2020-09-01 PROCEDURE — 99214 OFFICE O/P EST MOD 30 MIN: CPT | Performed by: INTERNAL MEDICINE

## 2020-09-01 NOTE — TELEPHONE ENCOUNTER
Monitor placed by [facility]  Length of monitor 30 DAY  Monitor ordered by KENNY HAN  Serial number [id number]  Kit [id number]  Activation successful prior to pt leaving office?  YES

## 2020-10-15 ENCOUNTER — TELEPHONE (OUTPATIENT)
Dept: CARDIOLOGY CLINIC | Age: 50
End: 2020-10-15

## 2020-10-15 PROCEDURE — 93268 ECG RECORD/REVIEW: CPT | Performed by: INTERNAL MEDICINE

## 2020-10-16 ENCOUNTER — TELEPHONE (OUTPATIENT)
Dept: CARDIOLOGY CLINIC | Age: 50
End: 2020-10-16

## 2020-10-16 NOTE — TELEPHONE ENCOUNTER
First of year is ok if not first available with EPNP.  Patient just had monitor placed yesterday needs to be 6 weeks out at least

## 2020-10-16 NOTE — TELEPHONE ENCOUNTER
She wore a 30 day monitor and was told by BAKARI to see PAPI about it . She last saw PAPI in 2019 . She can come in any day or time except 10/27/20 in the morning . Please call , LC if she doesn't answer , she has other doctor appts today and may not be able to answer phone.

## 2020-12-03 ENCOUNTER — OFFICE VISIT (OUTPATIENT)
Dept: CARDIOLOGY CLINIC | Age: 50
End: 2020-12-03
Payer: MEDICARE

## 2020-12-03 VITALS
WEIGHT: 133.6 LBS | HEIGHT: 63 IN | SYSTOLIC BLOOD PRESSURE: 130 MMHG | HEART RATE: 77 BPM | BODY MASS INDEX: 23.67 KG/M2 | DIASTOLIC BLOOD PRESSURE: 64 MMHG | OXYGEN SATURATION: 99 %

## 2020-12-03 PROCEDURE — G8427 DOCREV CUR MEDS BY ELIG CLIN: HCPCS | Performed by: NURSE PRACTITIONER

## 2020-12-03 PROCEDURE — G8420 CALC BMI NORM PARAMETERS: HCPCS | Performed by: NURSE PRACTITIONER

## 2020-12-03 PROCEDURE — 1036F TOBACCO NON-USER: CPT | Performed by: NURSE PRACTITIONER

## 2020-12-03 PROCEDURE — G8484 FLU IMMUNIZE NO ADMIN: HCPCS | Performed by: NURSE PRACTITIONER

## 2020-12-03 PROCEDURE — 99214 OFFICE O/P EST MOD 30 MIN: CPT | Performed by: NURSE PRACTITIONER

## 2020-12-03 PROCEDURE — 93000 ELECTROCARDIOGRAM COMPLETE: CPT | Performed by: NURSE PRACTITIONER

## 2020-12-03 NOTE — PROGRESS NOTES
[address]   Electrophysiology  DELILAH Schultz-CNP  Attending EP: Dr. Karla Shah   Date: 12/3/2020  I had the privilege of visiting Tameka Cisse in the office. Chief Complaint:   Chief Complaint   Patient presents with    Tachycardia    Palpitations     History of Present Illness: History obtained from patient and medical record. Tameka Cisse is 53 y.o. female with a past medical history of DM, scleroderma, thyroid issues, alpha 1 antitrypsin deficiency, fibromyalgia follows with rheumatology, and SVT. 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 S/p RHC and LHC 6/1/2016 that revealed no CAD and no PHTN. Follows with Dr. Ashia Ruiz and has had syncopal episode, wore Holter monitor that showed PVC/PAC with skipped beat symptom. Syncope felt to be vasovagal.     [employer] Monitor 9/1/2020-10/1/2020 that showed avg HR 75 (), ST vs SVT (likely ST), Short PAT, Symptoms with SR, PACs, and brief atrial run. Interval history: Today, Tameka Cisse is being seen for SVT and syncope. Reports she has been having palpitations and jumping/skipped beat. Admits she has had intermittent palpitations after the ablation, but recently worsening for the last 3-6 months. No identifiable aggravating or relieving factors. Admits to mild chest pressure with some episodes. Episodes have no significant effect on her activity level and she has continued to exercise regularly. Exercises every morning with 2 miles on the treadmill and an exercise video between 5-6 am which correlates to her tachy episodes on [facility]. She has history of syncope, she did not have palpitations prior to that event. There has been no recurrence. She gets regular thyroid monitoring. Denies having chest pain, palpitations, shortness of breath, swelling, cough, or dizziness at the time of this visit. With regard to medication therapy the patient has been compliant with prescribed regimen.  She has tolerated therapy to date.     Allergies: Allergies   Allergen Reactions    Cymbalta [Duloxetine Hcl]      Swelling of face    Imuran [Azathioprine] Nausea And Vomiting    Iron Supplement [Ferrous Sulfate] Shortness Of Breath     thinks it was from an additive    Spectracef [Cefditoren Pivoxil] Shortness Of Breath, Itching and Swelling    Bactrim Rash    Cephalosporins     Other      TREE NUTS  APPLES    Proventil [Albuterol]     Robaxin [Methocarbamol]     Tramadol Itching     Mouth Itch    Ciprofloxacin Swelling and Rash    Dilaudid [Hydromorphone] Nausea And Vomiting     Home Medications:  Prior to Visit Medications    Medication Sig Taking? Authorizing Provider   Calcium Carbonate-Vitamin D (CALTRATE 600+D PO) Take by mouth  Historical Provider, MD   levothyroxine (SYNTHROID) 75 MCG tablet Take 88 mcg by mouth Daily   Historical Provider, MD   betamethasone dipropionate (DIPROLENE) 0.05 % ointment Apply sparingly to affected area(s) bid prn for flares, up to 2 weeks at a time on any given area. Do not apply on cleared skin. Sherren Dolores, MD   hydroxychloroquine (PLAQUENIL) 200 MG tablet TAKE ONE TABLET BY MOUTH TWICE A DAY  Mansi Granados MD   gabapentin (NEURONTIN) 100 MG capsule Take 100 mg by mouth 2 times daily. Pascual Gutierrez Historical Provider, MD   diclofenac sodium 1 % GEL Apply 2 g topically 4 times daily as needed for Pain  Mansi Granados MD   promethazine (PHENERGAN) 12.5 MG tablet TAKE ONE TABLET BY MOUTH EVERY 6 HOURS AS NEEDED FOR NAUSEA  Historical Provider, MD   fluticasone (FLONASE) 50 MCG/ACT nasal spray 1 spray by Nasal route daily  Historical Provider, MD   pyridostigmine (MESTINON) 60 MG tablet Take 60 mg by mouth 3 times daily   Historical Provider, MD   omeprazole (PRILOSEC) 20 MG capsule Take 20 mg by mouth daily  Historical Provider, MD   aspirin 81 MG EC tablet Take 81 mg by mouth daily.     Historical Provider, MD      Past Medical History:  Past Medical History:   Diagnosis Date    Alpha-1-antitrypsin deficiency ([address]     Blood circulation, collateral rynaud's disease    Cardiac arrhythmia     Clostridium difficile diarrhea     DNA probe positive 8/7/11    Colitis 7/2011    Salmonela positive    Diarrhea     Fibromyalgia     GERD (gastroesophageal reflux disease)     irritable bowel    Hashimoto thyroiditis     History of recurrent miscarriages     3    Incompetent cervix     3 cervical procedures. 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 one antitrypsin deficiency    Interstitial cystitis     Interstitial cystitis 6/2011    Liver disease     alpha I anti-trypsin disease    Lyme disease 2003    Mastocytosis     Peripheral neuropathy     Pregnancy in a diabetic mother     Rash     Raynaud disease     Reactive hypoglycemia     Scleroderma ([address] 2008    Thyroid dysfunction in pregnancy     Thyroiditis     Postpartum, resolved    Type II diabetes mellitus with neurological manifestations ([address]      Past Surgical History:    has a past surgical history that includes Cystoscopy (7/2011); Dilation and curettage of uterus; Colonoscopy (7/29/2011); Diagnostic Cardiac Cath Lab Procedure; Mohs surgery (Right, 10/2015); ablation of dysrhythmic focus (03/2017); vesicovaginal fistula repair (04/2017); vasc upper extremity venous  uni (01/2017); and Ventricular ablation surgery. Social History:  Reviewed. reports that she has never smoked. She has never used smokeless tobacco. She reports that she does not drink alcohol or use drugs. Family History:  Reviewed. family history includes Cancer in her maternal grandfather, maternal grandmother, and maternal uncle; Diabetes in her maternal grandmother; Heart Disease in her maternal grandmother; Other in her daughter; Thyroid Disease in her maternal grandmother and mother.    Denies family history of sudden cardiac death, arrhythmia, premature CAD    Review of System:  · Constitutional: No fevers, chills, weight changes or weakness  · HEENT: No visual changes. No mouth sores or sore throat. · Cardiovascular: denies chest pain, denies dyspnea on exertion, admits to palpitations or denies loss of consciousness. No cough, hemoptysis, denies pleuritic pain, or phlebitis. denies dizziness. · Respiratory: denies cough or wheezing. No hematemesis. · Gastrointestinal: No abdominal pain, blood in stools. · Genitourinary: No dysuria, urgency or hematuria. · Musculoskeletal: denies gait disturbance, No focal muscle weakness. · Integumentary: No rash or pruritis. · Neurological: No headache, change in muscle strength, numbness or tingling. · Psychiatric: No confusion, anxiety, or depression. · Endocrine: No temperature intolerance. No excessive thirst, fluid intake, or urination. · Hem/Lymph: Denies abnormal bruising or bleeding. Physical Examination:  There were no vitals filed for this visit. Wt Readings from Last 3 Encounters:   09/01/20 131 lb (59.4 kg)   01/07/20 123 lb (55.8 kg)   08/27/19 123 lb 6.4 oz (56 kg)      Constitutional: Cooperative and in no apparent distress, and appears well nourished   Skin: Warm and pink; no pallor, cyanosis, bruising, or clubbing   HEENT: Symmetric and normocephalic. Conjunctiva pink with clear sclera. Mucus membranes pink and moist. No visible masses/goiter   Respiratory: Respirations symmetric and unlabored. Lungs clear to auscultation bilaterally, no wheezing, rhonchi, or crackles.  Cardiovascular:  regular rate and rhythm. S1 & S2 present without murmurs, rubs, or gallops. Peripheral pulses 2+, capillary refill < 3 seconds. negative elevation of JVP. No peripheral edema   Gastrointestinal: Abdomen soft and round.  Musculoskeletal:  No focal weakness.  Neurological/Psych: Awake and orientated to person, place and time. Calm affect, appropriate mood.      Pertinent labs, diagnostic, device, and imaging results reviewed as a part of this visit    LABS    CBC:   Lab Results   Component Value Date when in SVT, much more mild   - No limitation in activity   - Monitor showed ST that correlated with daily workouts on HPI  Atrial Runs/Tachycardia   - Some tachycardia noted on Holter, likely ST, corelates with exercise in the am   - Continue symptoms monitoring and consider ILR vs repeat monitor if persistent or worsens   SVT   - S/p ablation of AVNRT   - No recurrence  Syncope   - Likely vasovagal   - No recurrence  Plan  - No medications changes  - Call if symptoms worsen  - Follow with Dr. Helen Willingham regularly with Dr. Andrew Yates for routine follow up    F/U: Follow-up with EP in 6 months RMM  -Follow up with device clinic as scheduled  -Call Yoandy Bright [phone] at [phone] with any questions    Diet & Exercise:   The patient is counseled to follow a low salt diet to assure blood pressure remains controlled for cardiovascular risk factor modification   The patient is counseled to avoid excess caffeine, and energy drinks as this may exacerbated ectopy and arrhythmia   The patient is counseled to lose weight to control cardiovascular risk factors   Exercise program discussed: To improve overall cardiovascular health, the patient is instructed to increase cardiovascular related activities with a goal of 150 min/week of moderate level activity or 10,000 steps per day. Encouraged to perform as much activity as tolerated     I have addressed the patient's cardiac risk factors and adjusted pharmacologic treatment as needed. In addition, I have reinforced the need for patient directed risk factor modification. I independently reviewed the MCOT and ECG    All questions and concerns were addressed with the patient. Alternatives to treatment were discussed. Thank you for allowing to us to participate in the care of [address] [place] [address].     DELILAH Hughes-CAMILA [phone]   Office: [phone]

## 2021-01-04 ENCOUNTER — OFFICE VISIT (OUTPATIENT)
Dept: DERMATOLOGY | Age: 51
End: 2021-01-04
Payer: MEDICARE

## 2021-01-04 VITALS — TEMPERATURE: 97.3 F

## 2021-01-04 DIAGNOSIS — L57.0 ACTINIC KERATOSES: ICD-10-CM

## 2021-01-04 DIAGNOSIS — L82.0 INFLAMED SEBORRHEIC KERATOSIS: ICD-10-CM

## 2021-01-04 DIAGNOSIS — Z86.018 HISTORY OF DYSPLASTIC NEVUS: ICD-10-CM

## 2021-01-04 DIAGNOSIS — Z85.828 HISTORY OF NONMELANOMA SKIN CANCER: ICD-10-CM

## 2021-01-04 DIAGNOSIS — Z80.8 FAMILY HISTORY OF MELANOMA: ICD-10-CM

## 2021-01-04 DIAGNOSIS — D23.39 DERMAL NEVUS OF NOSE: ICD-10-CM

## 2021-01-04 DIAGNOSIS — D22.9 MULTIPLE BENIGN NEVI: Primary | ICD-10-CM

## 2021-01-04 DIAGNOSIS — Z12.83 SCREENING EXAM FOR SKIN CANCER: ICD-10-CM

## 2021-01-04 PROCEDURE — 1036F TOBACCO NON-USER: CPT | Performed by: DERMATOLOGY

## 2021-01-04 PROCEDURE — G8427 DOCREV CUR MEDS BY ELIG CLIN: HCPCS | Performed by: DERMATOLOGY

## 2021-01-04 PROCEDURE — G8420 CALC BMI NORM PARAMETERS: HCPCS | Performed by: DERMATOLOGY

## 2021-01-04 PROCEDURE — 17000 DESTRUCT PREMALG LESION: CPT | Performed by: DERMATOLOGY

## 2021-01-04 PROCEDURE — 99213 OFFICE O/P EST LOW 20 MIN: CPT | Performed by: DERMATOLOGY

## 2021-01-04 PROCEDURE — G8484 FLU IMMUNIZE NO ADMIN: HCPCS | Performed by: DERMATOLOGY

## 2021-01-04 PROCEDURE — 17003 DESTRUCT PREMALG LES 2-14: CPT | Performed by: DERMATOLOGY

## 2021-01-04 NOTE — PATIENT INSTRUCTIONS
Protecting Yourself From the Sun    · Apply an over-the-counter broad spectrum water resistant sunscreen with an SPF of at least 30 to exposed areas of the skin. Dont forget the ears and lips! Remember to reapply sunscreen about every 2 hours and after swimming or sweating. · Wear sun protective clothing. Swim shirts (aka. rash guards) are a great idea and negates the need to reapply sunscreen in those areas. · Seek the shade whenever possible especially between the hours of 10 am and 4 pm when the suns rays are the strongest.     · Avoid tanning beds       Cryosurgery (Freezing) Wound Care Instructions    AFTER THE PROCEDURE:    You will notice swelling and redness around the site. This is normal.    You may experience a sharp or sore feeling for the next several days. For this discomfort, you may take acetaminophen (Tylenol©).  A blister may develop at the treated area, sometimes as soon as by the end of the day. After several days, the blister will subside and a scab will form.  If the area is bumped or traumatized during the first few days following freezing, you may develop bleeding into the blister, forming a blood blister. This is nothing to be alarmed about.  If the blister is tense, uncomfortable, or much larger than the site that was frozen, you may pop the blister along its edge with a sterile needle (boiled, heated under a flame, or cleaned with alcohol) to allow the fluid to drain out. If the blister does not bother you, no treatment is needed.  Do NOT peel off the top of the blister roof. It will act as a dressing on top of your wound. WOUND CARE:    You may shower or bathe as usual, but avoid scrubbing the areas that have been frozen.  Cleanse the site twice a day with mild soapy water, and then apply a thin film of white petrolatum (Vaseline©).  You do not need to cover the area, but can if you prefer.     Do NOT allow the site to become dry or crusted, or attempt

## 2021-01-04 NOTE — PROGRESS NOTES
[facility]) Dermatology  [address]  1970    53 y.o. female     Date of Visit: 1/4/2021    Last Visit: 1yr    Chief Complaint: Skin check    History of Present Illness:  1. Here for skin check. Hx NMSC - sBCC R cheek s/p Mohs 10/2015. No new lesions of concern.   -Avoids sun and wears SPF 30+ sunscreen regularly. 2. Here for skin/mole check. No new moles. No moles changing in size, shape, color. None associated w/ pain, bleeding, pruritus.    -Hx mildly dysplastic nevus, L thigh - excised via biopsy 2/2020  -(+) family hx melanoma - uncle, grandmother    1. History of actinic keratoses s/p cryotherapy. 4. Complains of a red lesion on L forearm     5. Concerned about a raised asymptomatic lesion on L side of nose    Derm history:  -Beau's lines of toenails (likely due to recent delivery in the setting of bkgd Raynaud's, thyroid disease, diabetes). -Hx scleroderma (rheum Nubia Mousa) w/ significant Raynaud's phenomenon of toes > fingers, hx ulcers on fingertips (none on toes). On plaquenil and on/off prednisone. L neck lesion worsening 2013, started on imuran but d/c'ed after 1-2mo due to unexplained fevers. Uses lidex oint prn for pruritus of overlying skin. -Open comedones - atralin gel   -Dermatitis - lidex oint    Review of Systems:  Constitutional: Reports general sense of well-being. Skin: No interval severe sunburns. Allergies: Reviewed and updated. Past Medical History, Surgical History, Medications and Allergies reviewed.      Past Medical History:   Diagnosis Date    Alpha-1-antitrypsin deficiency ([facility] [address]     Blood circulation, collateral rynaud's disease    Cardiac arrhythmia     Clostridium difficile diarrhea     DNA probe positive 8/7/11    Colitis 7/2011    Salmonela positive    Diarrhea     Fibromyalgia     GERD (gastroesophageal reflux disease)     irritable bowel    Hashimoto thyroiditis     History of recurrent miscarriages     3  Incompetent cervix     3 cervical procedures.  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 one antitrypsin deficiency    Interstitial cystitis     Interstitial cystitis 6/2011    Liver disease     alpha I anti-trypsin disease    Lyme disease 2003    Mastocytosis     Peripheral neuropathy     Pregnancy in a diabetic mother     Rash     Raynaud disease     Reactive hypoglycemia     Scleroderma ([facility] [address] 2008    Thyroid dysfunction in pregnancy     Thyroiditis     Postpartum, resolved    Type II diabetes mellitus with neurological manifestations ([facility] [address]      Past Surgical History:   Procedure Laterality Date    ABLATION OF DYSRHYTHMIC FOCUS  03/2017    COLONOSCOPY  7/29/2011    CYSTOSCOPY  7/2011    DIAGNOSTIC CARDIAC CATH LAB PROCEDURE      DILATION AND CURETTAGE OF UTERUS      MOHS SURGERY Right 10/2015    VASC UPPER EXTREMITY VENOUS  UNI  01/2017    Left lower Leg    VENTRICULAR ABLATION SURGERY      VESICOVAGINAL FISTULA REPAIR  04/2017       Allergies   Allergen Reactions    Cymbalta [Duloxetine Hcl]      Swelling of face    Imuran [Azathioprine] Nausea And Vomiting    Iron Supplement [Ferrous Sulfate] Shortness Of Breath     thinks it was from an additive    Spectracef [Cefditoren Pivoxil] Shortness Of Breath, Itching and Swelling    Bactrim Rash    Cephalosporins     Other      TREE NUTS  APPLES    Proventil [Albuterol]     Robaxin [Methocarbamol]     Tramadol Itching     Mouth Itch    Ciprofloxacin Swelling and Rash    Dilaudid [Hydromorphone] Nausea And Vomiting     Outpatient Medications Marked as Taking for the 1/4/21 encounter (Office Visit) with Anna Quigley MD   Medication Sig Dispense Refill    Calcium Carbonate-Vitamin D (CALTRATE 600+D PO) Take by mouth      levothyroxine (SYNTHROID) 75 MCG tablet Take 88 mcg by mouth Daily       hydroxychloroquine (PLAQUENIL) 200 MG tablet TAKE ONE TABLET BY MOUTH TWICE A DAY 60 tablet 11    gabapentin (NEURONTIN) 100 MG capsule Take 100 mg by mouth 2 times daily. Lorri Temple diclofenac sodium 1 % GEL Apply 2 g topically 4 times daily as needed for Pain 1 Tube 5    promethazine (PHENERGAN) 12.5 MG tablet TAKE ONE TABLET BY MOUTH EVERY 6 HOURS AS NEEDED FOR NAUSEA      fluticasone (FLONASE) 50 MCG/ACT nasal spray 1 spray by Nasal route daily      pyridostigmine (MESTINON) 60 MG tablet Take 60 mg by mouth 3 times daily       omeprazole (PRILOSEC) 20 MG capsule Take 20 mg by mouth daily      aspirin 81 MG EC tablet Take 81 mg by mouth daily. Physical Examination     The following were examined and determined to be normal: Psych/Neuro, Scalp/hair, Conjunctivae/eyelids, Gums/teeth/lips, Neck, Nails/digits and Genitalia/groin/buttocks. The following were examined and determined to be abnormal: Head/face, Breast/axilla/chest, Abdomen, Back, RUE, LUE, RLE and LLE. -General: Well-appearing, NAD  1. R cheek - scar clear  2. Scattered on the face, trunk and extremities are multiple well-defined round and oval symmetric smoothly-bordered uniformly brown macules and papules. 3. R temple 1, R 1 and L 1 proximal nasal bridge  - ill-defined irregularly-shaped roughly-scaling thin pink macule(s)/papule(s)   4. Distal extensor L forearm - well-defined \"stuck-on\" verrucous tan-pink thin papule(s)  5. L nasal ala - few mm round symmetric slightly elevated soft skin-colored papule     Assessment and Plan     1. History of NMSC - clear today  -Reviewed sun protective behavior -- sun avoidance during the peak hours of the day, sun-protective clothing (including hat and sunglasses), OTC sunscreen use (water resistant, broad spectrum, SPF at least 30, need for reapplication every 2 to 3 hours), avoidance of tanning beds  -Full skin exam in 1 year (sooner if indicated)     2.  Benign acquired melanocytic nevi / hx dysplastic nevus / family hx melanoma  -Recommend monthly self skin exams   -Educated regarding the ABCDEs of melanoma detection -Call for any new/changing moles or concerning lesions    3. Actinic keratosis(es)  -Edu re: relationship with chronic cumulative sun exposure, low premalignant potential.   -3 lesion(s) treated w/ liquid nitrogen x 2 cycles - R temple 1, R 1 and L 1 proximal nasal bridge . Edu re: risk of blister formation, discomfort, scar, hypopigmentation. Discussed wound care w/ vaseline or Aquaphor. 4. Inflamed seborrheic keratosis(es)  -Reassurance re: benignity    5.  Dermal nevus, L ala  -Reassurance re: benign clinical features

## 2021-02-22 ENCOUNTER — TELEPHONE (OUTPATIENT)
Dept: DERMATOLOGY | Age: 51
End: 2021-02-22

## 2021-03-01 NOTE — PROGRESS NOTES
Reactions    Cymbalta [Duloxetine Hcl]      Swelling of face    Imuran [Azathioprine] Nausea And Vomiting    Iron Supplement [Ferrous Sulfate] Shortness Of Breath     thinks it was from an additive    Spectracef [Cefditoren Pivoxil] Shortness Of Breath, Itching and Swelling    Bactrim Rash    Cephalosporins     Other      TREE NUTS  APPLES    Proventil [Albuterol]     Robaxin [Methocarbamol]     Tramadol Itching     Mouth Itch    Ciprofloxacin Swelling and Rash    Dilaudid [Hydromorphone] Nausea And Vomiting     Current Outpatient Medications   Medication Sig Dispense Refill    VITAMIN D, CHOLECALCIFEROL, PO Take by mouth      Calcium Carbonate-Vitamin D (CALTRATE 600+D PO) Take by mouth      levothyroxine (SYNTHROID) 75 MCG tablet Take 88 mcg by mouth Daily       betamethasone dipropionate (DIPROLENE) 0.05 % ointment Apply sparingly to affected area(s) bid prn for flares, up to 2 weeks at a time on any given area. Do not apply on cleared skin. 45 g 0    hydroxychloroquine (PLAQUENIL) 200 MG tablet TAKE ONE TABLET BY MOUTH TWICE A DAY 60 tablet 11    gabapentin (NEURONTIN) 100 MG capsule Take 100 mg by mouth 2 times daily. Princess Yoon diclofenac sodium 1 % GEL Apply 2 g topically 4 times daily as needed for Pain 1 Tube 5    promethazine (PHENERGAN) 12.5 MG tablet TAKE ONE TABLET BY MOUTH EVERY 6 HOURS AS NEEDED FOR NAUSEA      fluticasone (FLONASE) 50 MCG/ACT nasal spray 1 spray by Nasal route daily      pyridostigmine (MESTINON) 60 MG tablet Take 60 mg by mouth 3 times daily       omeprazole (PRILOSEC) 20 MG capsule Take 40 mg by mouth daily       aspirin 81 MG EC tablet Take 81 mg by mouth daily. No current facility-administered medications for this visit.         Past Medical History:   Diagnosis Date    Alpha-1-antitrypsin deficiency ([facility] [address]     Blood circulation, collateral rynaud's disease    Cardiac arrhythmia     Clostridium difficile diarrhea     DNA probe positive 8/7/11    Colitis 7/2011    Salmonela positive    Diarrhea     Fibromyalgia     GERD (gastroesophageal reflux disease)     irritable bowel    Hashimoto thyroiditis     History of recurrent miscarriages     3    Incompetent cervix     3 cervical procedures.  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 one antitrypsin deficiency    Interstitial cystitis     Interstitial cystitis 6/2011    Liver disease     alpha I anti-trypsin disease    Lyme disease 2003    Mastocytosis     Peripheral neuropathy     Pregnancy in a diabetic mother     Rash     Raynaud disease     Reactive hypoglycemia     Scleroderma ([facility] [address] 2008    Thyroid dysfunction in pregnancy     Thyroiditis     Postpartum, resolved    Type II diabetes mellitus with neurological manifestations ([facility] [address]      Past Surgical History:   Procedure Laterality Date    ABLATION OF DYSRHYTHMIC FOCUS  03/2017    COLONOSCOPY  7/29/2011    CYSTOSCOPY  7/2011    DIAGNOSTIC CARDIAC CATH LAB PROCEDURE      DILATION AND CURETTAGE OF UTERUS      MOHS SURGERY Right 10/2015    VASC UPPER EXTREMITY VENOUS  UNI  01/2017    Left lower Leg    VENTRICULAR ABLATION SURGERY      VESICOVAGINAL FISTULA REPAIR  04/2017     Family History   Problem Relation Age of Onset    Cancer Maternal Grandfather         colon cancer    Thyroid Disease Mother     Diabetes Maternal Grandmother     Heart Disease Maternal Grandmother     Thyroid Disease Maternal Grandmother     Cancer Maternal Grandmother         melanoma    Other Daughter         connective tissues disease    Cancer Maternal Uncle         melanoma    High Cholesterol Neg Hx     High Blood Pressure Neg Hx      Social History     Socioeconomic History    Marital status:      Spouse name: Not on file    Number of children: Not on file    Years of education: Not on file    Highest education level: Not on file   Occupational History    Not on file   Social Needs    Financial resource strain: Not on file behavior. · Psychiatric: No anxiety, no depression. · Endocrine: No malaise, fatigue or temperature intolerance. No excessive thirst, fluid intake, or urination. No tremor. · Hematologic/Lymphatic: No abnormal bruising or bleeding, blood clots or swollen lymph nodes. · Allergic/Immunologic: No nasal congestion or hives. Physical Examination:    Vitals:    03/02/21 1010   BP: 106/60   Pulse: 72   SpO2: 100%   Weight: 133 lb (60.3 kg)   Height: 5' 3\" (1.6 m)     Body mass index is 23.56 kg/m². Wt Readings from Last 3 Encounters:   03/02/21 133 lb (60.3 kg)   12/03/20 133 lb 9.6 oz (60.6 kg)   09/01/20 131 lb (59.4 kg)     BP Readings from Last 3 Encounters:   03/02/21 106/60   12/03/20 130/64   09/01/20 120/70             Constitutional and General Appearance:   WD/WN in NAD  HEENT:  NC/AT  Skin:  Warm, dry  Respiratory:  · Normal excursion and expansion without use of accessory muscles  · Resp Auscultation: Normal breath sounds without dullness  Cardiovascular:  · The apical impulses not displaced  · Heart tones are crisp and normal  · Cervical veins are not engorged  · The carotid upstroke is normal in amplitude and contour without delay or bruit  · JVP normal  RRR with nl S1 and S2 without m,r,g  · Peripheral pulses are symmetrical and full  · There is no clubbing, cyanosis of the extremities.   · No edema  · Femoral Arteries: 2+ and equal  · Pedal Pulses: 2+ and equal   Neck:  · JVP less than 8 cm H2O  · No thyromegaly  Abdomen:  · No masses or tenderness  · Liver/Spleen: No Abnormalities Noted  Neurological/Psychiatric:  · Alert and oriented in all spheres  · Moves all extremities well  · Exhibits normal gait balance and coordination  · No abnormalities of mood, affect, memory, mentation, or behavior are noted          CATH: 6/1/2016  PA 18/6 TZ-by echo RVSP 47-4/16  ~Findings  Normal pulmonary pressures and outputs  Normal coronary arteries   Recommendation  ~Aggressive medical treatment and risk factor modification      GXT Myoview-4/19/2016  Normal LV size and systolic function. Left ventricular ejection fraction of   70 %. Normal wall motion. There is a small anterolateral defect that is   present at rest and worse at stress concerning for ischemia. However cannot   rule out artifact from breast attenuation and increased bowel uptake of   radio tracer. Echo: 07/02/19  Normal left ventricle systolic function with an estimated ejection fraction   of 55%. No regional wall motion abnormalities are seen. Normal left ventricular diastolic filling pressure. Mild aortic regurgitation. Mild mitral and tricuspid regurgitation. Systolic pulmonary artery pressure (SPAP) is normal and estimated at 23 mmHg   (right atrial pressure 3 mmHg). Echo: 07/01/20  Summary   Left ventricular systolic function is normal with a visually estimated   ejection fraction of 55%. No regional wall motion abnormalities are noted. Normal left ventricular diastolic function. Mild mitral, tricuspid and aortic regurgitation. Systolic pulmonary artery pressure (SPAP) is normal and estimated at 28 mmHg   (right atrial pressure 3 mmHg). Lipids: 10/09/18: , TG 51, HDL 82, LDL 93    Cardiac monitor: 09/2020   SVT vs ST. Labs were reviewed including labs from other hospital systems through [facility]. Cardiac testing was reviewed including echos, nuclear scans, cardiac catheterization, including from other hospital systems through [facility]. Assessment:  1. PAH (pulmonary artery hypertension) (HCC)    2. Scleroderma (HCC)    3. Palpitations    4. SOB (shortness of breath)    5. SVT (supraventricular tachycardia) ([address]      She does not appear to have any evidence of PAH. Continue surveillance      Plan:    1. BNP and CMP  2. Echo in 6 months  3. Follow up in 6 months        Scribe's attestation:   This note was scribed in the presence of Daina Leyva M.D. By Angelina Enriquez RN    [email or web address]        Time Based Itemization  A total of 30 minutes was spent on today's patient encounter. If applicable, non-patient-facing activities:  ( x)Preparing to see the patient and reviewing records  ( ) Individual interpretation of results  ( ) Discussion or coordination of care with other health care professionals  ( x) Ordering of unique tests, medications, or procedures  ( x) Documentation within the EHR       I appreciate the opportunity of cooperating in the care of this patient.     Daina Leyva M.D., [facility] [place]

## 2021-03-02 ENCOUNTER — OFFICE VISIT (OUTPATIENT)
Dept: CARDIOLOGY CLINIC | Age: 51
End: 2021-03-02
Payer: MEDICARE

## 2021-03-02 ENCOUNTER — HOSPITAL ENCOUNTER (OUTPATIENT)
Age: 51
Discharge: HOME | End: 2021-03-02
Payer: MEDICARE

## 2021-03-02 VITALS
HEIGHT: 63 IN | DIASTOLIC BLOOD PRESSURE: 60 MMHG | SYSTOLIC BLOOD PRESSURE: 106 MMHG | WEIGHT: 133 LBS | OXYGEN SATURATION: 100 % | BODY MASS INDEX: 23.57 KG/M2 | HEART RATE: 72 BPM

## 2021-03-02 DIAGNOSIS — I27.21 PAH (PULMONARY ARTERY HYPERTENSION) (HCC): Primary | ICD-10-CM

## 2021-03-02 DIAGNOSIS — R06.02 SOB (SHORTNESS OF BREATH): ICD-10-CM

## 2021-03-02 DIAGNOSIS — I47.1 SVT (SUPRAVENTRICULAR TACHYCARDIA) (HCC): ICD-10-CM

## 2021-03-02 DIAGNOSIS — R00.2 PALPITATIONS: ICD-10-CM

## 2021-03-02 DIAGNOSIS — M34.9 SCLERODERMA (HCC): ICD-10-CM

## 2021-03-02 LAB
A/G RATIO: 1.2 (ref 1.1–2.2)
ALBUMIN SERPL-MCNC: 4.6 G/DL (ref 3.4–5)
ALP BLD-CCNC: 49 U/L (ref 40–129)
ALT SERPL-CCNC: 21 U/L (ref 10–40)
ANION GAP SERPL CALCULATED.3IONS-SCNC: 13 MMOL/L (ref 3–16)
AST SERPL-CCNC: 27 U/L (ref 15–37)
BASOPHILS ABSOLUTE: 0.1 K/UL (ref 0–0.2)
BASOPHILS RELATIVE PERCENT: 0.6 %
BILIRUB SERPL-MCNC: 0.3 MG/DL (ref 0–1)
BUN BLDV-MCNC: 9 MG/DL (ref 7–20)
CALCIUM SERPL-MCNC: 9.4 MG/DL (ref 8.3–10.6)
CHLORIDE BLD-SCNC: 103 MMOL/L (ref 99–110)
CO2: 25 MMOL/L (ref 21–32)
CREAT SERPL-MCNC: 0.7 MG/DL (ref 0.6–1.1)
EOSINOPHILS ABSOLUTE: 0.2 K/UL (ref 0–0.6)
EOSINOPHILS RELATIVE PERCENT: 1.9 %
FERRITIN: 12.5 NG/ML (ref 15–150)
GFR AFRICAN AMERICAN: >60
GFR NON-AFRICAN AMERICAN: >60
GLOBULIN: 3.7 G/DL
GLUCOSE BLD-MCNC: 87 MG/DL (ref 70–99)
HCT VFR BLD CALC: 36.4 % (ref 36–48)
HEMOGLOBIN: 11.8 G/DL (ref 12–16)
HEPATITIS C ANTIBODY INTERPRETATION: NORMAL
IRON SATURATION: 8 % (ref 15–50)
IRON: 29 UG/DL (ref 37–145)
LYMPHOCYTES ABSOLUTE: 2.1 K/UL (ref 1–5.1)
LYMPHOCYTES RELATIVE PERCENT: 24.8 %
MAGNESIUM: 1.9 MG/DL (ref 1.8–2.4)
MCH RBC QN AUTO: 26.7 PG (ref 26–34)
MCHC RBC AUTO-ENTMCNC: 32.5 G/DL (ref 31–36)
MCV RBC AUTO: 82.2 FL (ref 80–100)
MONOCYTES ABSOLUTE: 0.6 K/UL (ref 0–1.3)
MONOCYTES RELATIVE PERCENT: 6.5 %
NEUTROPHILS ABSOLUTE: 5.7 K/UL (ref 1.7–7.7)
NEUTROPHILS RELATIVE PERCENT: 66.2 %
PDW BLD-RTO: 15.8 % (ref 12.4–15.4)
PLATELET # BLD: 215 K/UL (ref 135–450)
PMV BLD AUTO: 11.7 FL (ref 5–10.5)
POTASSIUM SERPL-SCNC: 4.4 MMOL/L (ref 3.5–5.1)
PRO-BNP: 82 PG/ML (ref 0–124)
RBC # BLD: 4.43 M/UL (ref 4–5.2)
SODIUM BLD-SCNC: 141 MMOL/L (ref 136–145)
T4 FREE: 1.3 NG/DL (ref 0.9–1.8)
TOTAL IRON BINDING CAPACITY: 355 UG/DL (ref 260–445)
TOTAL PROTEIN: 8.3 G/DL (ref 6.4–8.2)
TSH SERPL DL<=0.05 MIU/L-ACNC: 1.88 UIU/ML (ref 0.27–4.2)
VITAMIN D 25-HYDROXY: 50.3 NG/ML
WBC # BLD: 8.6 K/UL (ref 4–11)

## 2021-03-02 PROCEDURE — G8484 FLU IMMUNIZE NO ADMIN: HCPCS | Performed by: INTERNAL MEDICINE

## 2021-03-02 PROCEDURE — 84443 ASSAY THYROID STIM HORMONE: CPT

## 2021-03-02 PROCEDURE — 36415 COLL VENOUS BLD VENIPUNCTURE: CPT

## 2021-03-02 PROCEDURE — 86803 HEPATITIS C AB TEST: CPT

## 2021-03-02 PROCEDURE — 83550 IRON BINDING TEST: CPT

## 2021-03-02 PROCEDURE — 82306 VITAMIN D 25 HYDROXY: CPT

## 2021-03-02 PROCEDURE — G8420 CALC BMI NORM PARAMETERS: HCPCS | Performed by: INTERNAL MEDICINE

## 2021-03-02 PROCEDURE — 82728 ASSAY OF FERRITIN: CPT

## 2021-03-02 PROCEDURE — 85025 COMPLETE CBC W/AUTO DIFF WBC: CPT

## 2021-03-02 PROCEDURE — 86702 HIV-2 ANTIBODY: CPT

## 2021-03-02 PROCEDURE — 84439 ASSAY OF FREE THYROXINE: CPT

## 2021-03-02 PROCEDURE — G8427 DOCREV CUR MEDS BY ELIG CLIN: HCPCS | Performed by: INTERNAL MEDICINE

## 2021-03-02 PROCEDURE — 83735 ASSAY OF MAGNESIUM: CPT

## 2021-03-02 PROCEDURE — 80053 COMPREHEN METABOLIC PANEL: CPT

## 2021-03-02 PROCEDURE — 99214 OFFICE O/P EST MOD 30 MIN: CPT | Performed by: INTERNAL MEDICINE

## 2021-03-02 PROCEDURE — 83880 ASSAY OF NATRIURETIC PEPTIDE: CPT

## 2021-03-02 PROCEDURE — 83036 HEMOGLOBIN GLYCOSYLATED A1C: CPT

## 2021-03-02 PROCEDURE — 86701 HIV-1ANTIBODY: CPT

## 2021-03-02 PROCEDURE — 1036F TOBACCO NON-USER: CPT | Performed by: INTERNAL MEDICINE

## 2021-03-02 PROCEDURE — 83540 ASSAY OF IRON: CPT

## 2021-03-02 PROCEDURE — 87390 HIV-1 AG IA: CPT

## 2021-03-02 NOTE — LETTER
[address]   Advanced Heart Failure/Pulmonary Hypertension  Cardiac Evaluation      Ria Wilkerson  YOB: 1970    Date of Visit:  3/2/21    Chief Complaint   Patient presents with    Follow-up    Numbness     right arm    Dizziness     only if she moves her head a certain way         History of Present Illness:   Ria Wilkerson is a 53 y.o. female was seen at the request of Dr. Uzair Schumacher for consultation of [address]. She was seen by Dr. Anyi Moreno in the past for her [address] but wants to switch to me. She has a complex history of PAH, Scleroderma, Alpha 1 antitrypsin deficiency, Raynauds and arthritis. She had thyroid dysfunction and diabetes with her pregnancy. She also has cervical stenosis. She is a mother of 5 children. She sees Dr. Tala Downs yearly for scleroderma and gets yearly CT scans. She sees endocrinologist for thyroid problems and diabetes. Was having ptosis on her eye with double vision and was started on high dose prednisone. She wore a 24 hour monitor but no racing heart beats at the time she was wearing it. She was seen by EPO years ago and has tried a medication name unknown to slow the HR but her BP went too low and the medication was stopped. Family history includes GM with heart disease at age [de-identified]. Her other GM has a pacemaker. She underwent R & LHC on 6/1/2016 that revealed no CAD and no PHTN. She has been following with Dr Tala Downs for scleroderma. She underwent an SVT ablation on 3/15/17. She wore a 48 hour holter monitor. Her preliminary echo from 4/11/17 shows normal function. On 4/5/18 she was sitting at her kitchen bench and fainted. She states she does not remember going down. She states this has never happened before. She wore a 48 hour holter monitor 4/13/18-4/18/18 which was within normal limits. She states she is getting a ultrasound of her thyroid on 4/18/18 and lab work. She had an Echo 04/17/18 showed EF of 60%.      She wore a AGNES from 5/29-6/27/18 that showed PAC's and PVC\"S. She states she has had no more episodes of syncope. She is following with Dr. Joycelyn Barboza for endocrinology. On 02/11/19 she reports she had left sided neck pain. About 2.5 weeks prior while working out on the treadmill, she had left side neck pain that radiates into the temple and she couldn't hear out of her left ear. She reports since then she has nerve pain and ongoing jaw pain. She reports the sensation \"flares up\" since then. She states the hearing loss lasted 30 mins. She has been taking ibuprofen 600 mg. She states it feels vascular and feels it may rupture. Her neck is tender to palpation. She has SOB with climbing steps which is not new. She reports occasional palpitations if she squats down. She takes an aspirin 81 mg daily. Her echo from 07/02/19 showed her EF was 55%. Mild AR, MR and KS. Her echo from 07/01/20 showed her EF was 55%. Mild MR, TR, and AR. SPAP 28 mmHg. On 09/01/20 she reported she was having palpitations again. She had a severe episode on 08/31/20 that was sudden onset. The episodes were getting stronger and more frequent. She reported she is having vascular issues in her legs with bruising and redness. Her toes on her left foot change colors to almost black. She reports her legs and feet are tender to touch. She reports her skin in her upper legs gets hot. Her cardiac monitor from 09/2020 showed SVT vs ST. She was to follow up with DR Landen Chacon. Today she reports she received her first dose of COVID vaccine. She reports she has dizziness that is progressively worsening with head movement. She reports she has pins and needles in her right arm. She has not been using her treadmill due to the dizziness. She reports she has right shoulder pain and wrist pain. She reports the palpitations are unchanged. She reports an occasional hard beat. She reports occasional CP which is unchanged. She denies BLE edema, SOB, fatigue or syncope.         Allergies Allergen Reactions    Cymbalta [Duloxetine Hcl]      Swelling of face    Imuran [Azathioprine] Nausea And Vomiting    Iron Supplement [Ferrous Sulfate] Shortness Of Breath     thinks it was from an additive    Spectracef [Cefditoren Pivoxil] Shortness Of Breath, Itching and Swelling    Bactrim Rash    Cephalosporins     Other      TREE NUTS  APPLES    Proventil [Albuterol]     Robaxin [Methocarbamol]     Tramadol Itching     Mouth Itch    Ciprofloxacin Swelling and Rash    Dilaudid [Hydromorphone] Nausea And Vomiting     Current Outpatient Medications   Medication Sig Dispense Refill    VITAMIN D, CHOLECALCIFEROL, PO Take by mouth      Calcium Carbonate-Vitamin D (CALTRATE 600+D PO) Take by mouth      levothyroxine (SYNTHROID) 75 MCG tablet Take 88 mcg by mouth Daily       betamethasone dipropionate (DIPROLENE) 0.05 % ointment Apply sparingly to affected area(s) bid prn for flares, up to 2 weeks at a time on any given area. Do not apply on cleared skin. 45 g 0    hydroxychloroquine (PLAQUENIL) 200 MG tablet TAKE ONE TABLET BY MOUTH TWICE A DAY 60 tablet 11    gabapentin (NEURONTIN) 100 MG capsule Take 100 mg by mouth 2 times daily. Renleonides Deacon diclofenac sodium 1 % GEL Apply 2 g topically 4 times daily as needed for Pain 1 Tube 5    promethazine (PHENERGAN) 12.5 MG tablet TAKE ONE TABLET BY MOUTH EVERY 6 HOURS AS NEEDED FOR NAUSEA      fluticasone (FLONASE) 50 MCG/ACT nasal spray 1 spray by Nasal route daily      pyridostigmine (MESTINON) 60 MG tablet Take 60 mg by mouth 3 times daily       omeprazole (PRILOSEC) 20 MG capsule Take 40 mg by mouth daily       aspirin 81 MG EC tablet Take 81 mg by mouth daily. No current facility-administered medications for this visit.         Past Medical History:   Diagnosis Date    Alpha-1-antitrypsin deficiency ([facility] [address]     Blood circulation, collateral rynaud's disease    Cardiac arrhythmia     Clostridium difficile diarrhea     DNA probe positive 8/7/11  Colitis 7/2011    Salmonela positive    Diarrhea     Fibromyalgia     GERD (gastroesophageal reflux disease)     irritable bowel    Hashimoto thyroiditis     History of recurrent miscarriages     3    Incompetent cervix     3 cervical procedures.  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 one antitrypsin deficiency    Interstitial cystitis     Interstitial cystitis 6/2011    Liver disease     alpha I anti-trypsin disease    Lyme disease 2003    Mastocytosis     Peripheral neuropathy     Pregnancy in a diabetic mother     Rash     Raynaud disease     Reactive hypoglycemia     Scleroderma ([facility] [address] 2008    Thyroid dysfunction in pregnancy     Thyroiditis     Postpartum, resolved    Type II diabetes mellitus with neurological manifestations ([facility] [address]      Past Surgical History:   Procedure Laterality Date    ABLATION OF DYSRHYTHMIC FOCUS  03/2017    COLONOSCOPY  7/29/2011    CYSTOSCOPY  7/2011    DIAGNOSTIC CARDIAC CATH LAB PROCEDURE      DILATION AND CURETTAGE OF UTERUS      MOHS SURGERY Right 10/2015    VASC UPPER EXTREMITY VENOUS  UNI  01/2017    Left lower Leg    VENTRICULAR ABLATION SURGERY      VESICOVAGINAL FISTULA REPAIR  04/2017     Family History   Problem Relation Age of Onset    Cancer Maternal Grandfather         colon cancer    Thyroid Disease Mother     Diabetes Maternal Grandmother     Heart Disease Maternal Grandmother     Thyroid Disease Maternal Grandmother     Cancer Maternal Grandmother         melanoma    Other Daughter         connective tissues disease    Cancer Maternal Uncle         melanoma    High Cholesterol Neg Hx     High Blood Pressure Neg Hx      Social History     Socioeconomic History    Marital status:      Spouse name: Not on file    Number of children: Not on file    Years of education: Not on file    Highest education level: Not on file   Occupational History    Not on file   Social Needs    Financial resource strain: Not on file    Food insecurity     Worry: Not on file     Inability: Not on file    Transportation needs     Medical: Not on file     Non-medical: Not on file   Tobacco Use    Smoking status: Never Smoker    Smokeless tobacco: Never Used   Substance and Sexual Activity    Alcohol use: No     Alcohol/week: 0.0 standard drinks    Drug use: No    Sexual activity: Not on file   Lifestyle    Physical activity     Days per week: Not on file     Minutes per session: Not on file    Stress: Not on file   Relationships    Social connections     Talks on phone: Not on file     Gets together: Not on file     Attends [employer] service: Not on file     Active member of club or organization: Not on file     Attends meetings of clubs or organizations: Not on file     Relationship status: Not on file    Intimate partner violence     Fear of current or ex partner: Not on file     Emotionally abused: Not on file     Physically abused: Not on file     Forced sexual activity: Not on file   Other Topics Concern    Not on file   Social History Narrative    Not on file       Review of Systems:   · Constitutional: there has been no unanticipated weight loss. There's been no change in energy level, sleep pattern, or activity level. · Eyes: No visual changes or diplopia. No scleral icterus. · ENT: No Headaches, hearing loss or vertigo. No mouth sores or sore throat. · Cardiovascular: Reviewed in HPI  · Respiratory: No cough or wheezing, no sputum production. No hematemesis. · Gastrointestinal: No abdominal pain, appetite loss, blood in stools. No change in bowel or bladder habits. · Genitourinary: No dysuria, trouble voiding, or hematuria. · Musculoskeletal:  No gait disturbance, weakness or joint complaints. · Integumentary: No rash or pruritis. · Neurological: No headache, diplopia, change in muscle strength, numbness or tingling. No change in gait, balance, coordination, mood, affect, memory, mentation, behavior.   · Psychiatric: No anxiety, no depression. · Endocrine: No malaise, fatigue or temperature intolerance. No excessive thirst, fluid intake, or urination. No tremor. · Hematologic/Lymphatic: No abnormal bruising or bleeding, blood clots or swollen lymph nodes. · Allergic/Immunologic: No nasal congestion or hives. Physical Examination:    Vitals:    03/02/21 1010   BP: 106/60   Pulse: 72   SpO2: 100%   Weight: 133 lb (60.3 kg)   Height: 5' 3\" (1.6 m)     Body mass index is 23.56 kg/m². Wt Readings from Last 3 Encounters:   03/02/21 133 lb (60.3 kg)   12/03/20 133 lb 9.6 oz (60.6 kg)   09/01/20 131 lb (59.4 kg)     BP Readings from Last 3 Encounters:   03/02/21 106/60   12/03/20 130/64   09/01/20 120/70             Constitutional and General Appearance:   WD/WN in NAD  HEENT:  NC/AT  Skin:  Warm, dry  Respiratory:  · Normal excursion and expansion without use of accessory muscles  · Resp Auscultation: Normal breath sounds without dullness  Cardiovascular:  · The apical impulses not displaced  · Heart tones are crisp and normal  · Cervical veins are not engorged  · The carotid upstroke is normal in amplitude and contour without delay or bruit  · JVP normal  RRR with nl S1 and S2 without m,r,g  · Peripheral pulses are symmetrical and full  · There is no clubbing, cyanosis of the extremities.   · No edema  · Femoral Arteries: 2+ and equal  · Pedal Pulses: 2+ and equal   Neck:  · JVP less than 8 cm H2O  · No thyromegaly  Abdomen:  · No masses or tenderness  · Liver/Spleen: No Abnormalities Noted  Neurological/Psychiatric:  · Alert and oriented in all spheres  · Moves all extremities well  · Exhibits normal gait balance and coordination  · No abnormalities of mood, affect, memory, mentation, or behavior are noted          CATH: 6/1/2016  PA 18/6 TZ-by echo RVSP 47-4/16  ~Findings  Normal pulmonary pressures and outputs  Normal coronary arteries   Recommendation  ~Aggressive medical treatment and risk factor modification      GXT Myoview-4/19/2016  Normal LV size and systolic function. Left ventricular ejection fraction of   70 %. Normal wall motion. There is a small anterolateral defect that is   present at rest and worse at stress concerning for ischemia. However cannot   rule out artifact from breast attenuation and increased bowel uptake of   radio tracer. Echo: 07/02/19  Normal left ventricle systolic function with an estimated ejection fraction   of 55%. No regional wall motion abnormalities are seen. Normal left ventricular diastolic filling pressure. Mild aortic regurgitation. Mild mitral and tricuspid regurgitation. Systolic pulmonary artery pressure (SPAP) is normal and estimated at 23 mmHg   (right atrial pressure 3 mmHg). Echo: 07/01/20  Summary   Left ventricular systolic function is normal with a visually estimated   ejection fraction of 55%. No regional wall motion abnormalities are noted. Normal left ventricular diastolic function. Mild mitral, tricuspid and aortic regurgitation. Systolic pulmonary artery pressure (SPAP) is normal and estimated at 28 mmHg   (right atrial pressure 3 mmHg). Lipids: 10/09/18: , TG 51, HDL 82, LDL 93    Cardiac monitor: 09/2020   SVT vs ST. Labs were reviewed including labs from other hospital systems through [facility]. Cardiac testing was reviewed including echos, nuclear scans, cardiac catheterization, including from other hospital systems through [facility]. Assessment:  1. PAH (pulmonary artery hypertension) (HCC)    2. Scleroderma (HCC)    3. Palpitations    4. SOB (shortness of breath)    5. SVT (supraventricular tachycardia) ([address]      She does not appear to have any evidence of PAH. Continue surveillance      Plan:    1. BNP and CMP  2. Echo in 6 months  3. Follow up in 6 months        Scribe's attestation: This note was scribed in the presence of Patrice Mohamud M.D. By Ashley Cook RN    . [email or web address]        Time

## 2021-03-03 LAB
ESTIMATED AVERAGE GLUCOSE: 108.3 MG/DL
HBA1C MFR BLD: 5.4 %
HIV AG/AB: NORMAL
HIV ANTIGEN: NORMAL
HIV-1 ANTIBODY: NORMAL
HIV-2 AB: NORMAL

## 2021-03-12 LAB
ALBUMIN SERPL-MCNC: 4.4 G/DL
ALP BLD-CCNC: 49 U/L
ALT SERPL-CCNC: 18 U/L
ANION GAP SERPL CALCULATED.3IONS-SCNC: NORMAL MMOL/L
AST SERPL-CCNC: 22 U/L
AVERAGE GLUCOSE: 108
BILIRUB SERPL-MCNC: 0.4 MG/DL (ref 0.1–1.4)
BUN BLDV-MCNC: 10 MG/DL
CALCIUM SERPL-MCNC: 9.4 MG/DL
CHLORIDE BLD-SCNC: 104 MMOL/L
CHOLESTEROL, TOTAL: 170 MG/DL
CHOLESTEROL/HDL RATIO: 2.6
CO2: 21 MMOL/L
CREAT SERPL-MCNC: 0.81 MG/DL
FERRITIN: 11 NG/ML (ref 9–150)
GFR CALCULATED: 86
GLUCOSE BLD-MCNC: 89 MG/DL
HBA1C MFR BLD: 5.4 %
HDLC SERPL-MCNC: 66 MG/DL (ref 35–70)
LDL CHOLESTEROL CALCULATED: 95 MG/DL (ref 0–160)
NONHDLC SERPL-MCNC: NORMAL MG/DL
POTASSIUM SERPL-SCNC: 4.4 MMOL/L
SODIUM BLD-SCNC: 140 MMOL/L
TOTAL PROTEIN: 7.1
TRIGL SERPL-MCNC: 41 MG/DL
VITAMIN D 25-HYDROXY: 38.5
VITAMIN D2, 25 HYDROXY: NORMAL
VITAMIN D3,25 HYDROXY: NORMAL
VLDLC SERPL CALC-MCNC: 9 MG/DL

## 2021-06-17 NOTE — PROGRESS NOTES
[facility]   Cardiac Consultation    Referring Provider:  Miaco Marsh       HPI:  Joan Briggs is a 53 y.o. female  female with a past medical history of DM, scleroderma, thyroid issues, alpha 1 antitrypsin deficiency, fibromyalgia follows with rheumatology, and SVT. 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 S/p RHC and LHC 6/1/2016 that revealed no CAD and no PHTN. Follows with Dr. Richard León and has had syncopal episode, wore Holter monitor that showed PVC/PAC with skipped beat symptom. Syncope felt to be vasovagal.      Med[employer] Monitor 9/1/2020-10/1/2020 that showed avg HR 75 (), ST vs SVT (likely ST), Short PAT, Symptoms with SR, PACs, and brief atrial run. James Rowdy presents to the office in follow up. She continues to have complaints of feeling an irregular rhythm and palpitations. Recently diagnosed with anemia and is continued to be tested for autoimmune disorders. She appears to be more symptomatic with her palpitations. She also describes retrosternal chest discomfort with radiation to the left side of the neck induced by exercise. She has family history of coronary artery disease with coronary artery bypass surgery in her mother. We discussed last stress test was in 2016 along with a LHC/RHC that revealed no CAD. Past Medical History:   has a past medical history of Alpha-1-antitrypsin deficiency ([address] Blood circulation, collateral, Cardiac arrhythmia, Clostridium difficile diarrhea, Colitis, Diarrhea, Fibromyalgia, GERD (gastroesophageal reflux disease), Hashimoto thyroiditis, History of recurrent miscarriages, Incompetent cervix, Interstitial cystitis, Interstitial cystitis, Liver disease, Lyme disease, Mastocytosis, Peripheral neuropathy, Pregnancy in a diabetic mother, Rash, Raynaud disease, Reactive hypoglycemia, Scleroderma ([address] Thyroid dysfunction in pregnancy, Thyroiditis, and Type II diabetes mellitus with neurological manifestations ([address]     Surgical History:   has a past surgical history that includes Cystoscopy (7/2011); Dilation and curettage of uterus; Colonoscopy (7/29/2011); Diagnostic Cardiac Cath Lab Procedure; Mohs surgery (Right, 10/2015); ablation of dysrhythmic focus (03/2017); vesicovaginal fistula repair (04/2017); vasc upper extremity venous  uni (01/2017); and Ventricular ablation surgery. Social History:   reports that she has never smoked. She has never used smokeless tobacco. She reports that she does not drink alcohol and does not use drugs. Family History:  family history includes Cancer in her maternal grandfather, maternal grandmother, and maternal uncle; Diabetes in her maternal grandmother; Heart Disease in her maternal grandmother; Other in her daughter; Thyroid Disease in her maternal grandmother and mother. Home Medications:  Outpatient Encounter Medications as of 6/21/2021   Medication Sig Dispense Refill    VITAMIN D, CHOLECALCIFEROL, PO Take by mouth      Calcium Carbonate-Vitamin D (CALTRATE 600+D PO) Take by mouth      levothyroxine (SYNTHROID) 75 MCG tablet Take 88 mcg by mouth Daily       betamethasone dipropionate (DIPROLENE) 0.05 % ointment Apply sparingly to affected area(s) bid prn for flares, up to 2 weeks at a time on any given area. Do not apply on cleared skin. 45 g 0    hydroxychloroquine (PLAQUENIL) 200 MG tablet TAKE ONE TABLET BY MOUTH TWICE A DAY 60 tablet 11    gabapentin (NEURONTIN) 100 MG capsule Take 100 mg by mouth 2 times daily. Junie Bailey diclofenac sodium 1 % GEL Apply 2 g topically 4 times daily as needed for Pain 1 Tube 5    promethazine (PHENERGAN) 12.5 MG tablet TAKE ONE TABLET BY MOUTH EVERY 6 HOURS AS NEEDED FOR NAUSEA      fluticasone (FLONASE) 50 MCG/ACT nasal spray 1 spray by Nasal route daily      pyridostigmine (MESTINON) 60 MG tablet Take 60 mg by mouth 3 times daily       omeprazole (PRILOSEC) 20 MG capsule Take 40 mg by mouth daily       aspirin 81 MG EC tablet Take 81 mg by mouth daily. No facility-administered encounter medications on file as of 6/21/2021. Allergies:  Cymbalta [duloxetine hcl], Imuran [azathioprine], Iron supplement [ferrous sulfate], Spectracef [cefditoren pivoxil], Bactrim, Cephalosporins, Other, Proventil [albuterol], Robaxin [methocarbamol], Tramadol, Ciprofloxacin, and Dilaudid [hydromorphone]     Review of Systems   Constitutional: Negative. HENT: Negative. Eyes: Negative. Respiratory: Negative. Cardiovascular: Negative. Gastrointestinal: Negative. Genitourinary: Negative. Musculoskeletal: Negative. Skin: Negative. Neurological: Negative. Hematological: Negative. Psychiatric/Behavioral: Negative. /74   Pulse 88   Ht 5' 3\" (1.6 m)   Wt 128 lb 6.4 oz (58.2 kg)   SpO2 99%   BMI 22.75 kg/m²       Objective:  Physical Exam   Constitutional: She is oriented to person, place, and time. She appears well-developed and well-nourished. HENT:   Head: Normocephalic and atraumatic. Eyes: Pupils are equal, round, and reactive to light. Neck: Normal range of motion. Cardiovascular: Normal rate, regular rhythm and normal heart sounds. Pulmonary/Chest: Effort normal and breath sounds normal.   Abdominal: Soft. No tenderness. Musculoskeletal: Normal range of motion. She exhibits no edema. Neurological: She is alert and oriented to person, place, and time. Skin: Skin is warm and dry. Psychiatric: She has a normal mood and affect.      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  -Counseled to follow a low salt diet to assure blood pressure remains controlled for cardiovascular risk factor modification.   -The patient is counseled to get regular exercise 3-5 times per week and maintain a healthy weight reduce cardiovascular risk factors. Plan:  -GXT myoview to be scheduled   -Follow up EPNP at [facility] attestation: This note was scribed in the presence of Latisha De Souza MD by Sheri Tripp RN      I, Dr. Latisha De Souza, personally performed the services described in this documentation as scribed by Sheri Tripp RN in my presence, and it is both accurate and complete.

## 2021-06-18 ENCOUNTER — HOSPITAL ENCOUNTER (OUTPATIENT)
Age: 51
Discharge: HOME | End: 2021-06-18
Payer: MEDICARE

## 2021-06-18 PROCEDURE — 86255 FLUORESCENT ANTIBODY SCREEN: CPT

## 2021-06-18 PROCEDURE — 83516 IMMUNOASSAY NONANTIBODY: CPT

## 2021-06-21 ENCOUNTER — OFFICE VISIT (OUTPATIENT)
Dept: CARDIOLOGY CLINIC | Age: 51
End: 2021-06-21
Payer: MEDICARE

## 2021-06-21 VITALS
HEIGHT: 63 IN | WEIGHT: 128.4 LBS | SYSTOLIC BLOOD PRESSURE: 124 MMHG | HEART RATE: 88 BPM | BODY MASS INDEX: 22.75 KG/M2 | DIASTOLIC BLOOD PRESSURE: 74 MMHG | OXYGEN SATURATION: 99 %

## 2021-06-21 DIAGNOSIS — R07.9 CHEST PAIN, UNSPECIFIED TYPE: ICD-10-CM

## 2021-06-21 DIAGNOSIS — R00.2 PALPITATIONS: Primary | ICD-10-CM

## 2021-06-21 PROCEDURE — G8420 CALC BMI NORM PARAMETERS: HCPCS | Performed by: INTERNAL MEDICINE

## 2021-06-21 PROCEDURE — 93000 ELECTROCARDIOGRAM COMPLETE: CPT | Performed by: INTERNAL MEDICINE

## 2021-06-21 PROCEDURE — 99214 OFFICE O/P EST MOD 30 MIN: CPT | Performed by: INTERNAL MEDICINE

## 2021-06-21 PROCEDURE — G8427 DOCREV CUR MEDS BY ELIG CLIN: HCPCS | Performed by: INTERNAL MEDICINE

## 2021-06-21 PROCEDURE — 1036F TOBACCO NON-USER: CPT | Performed by: INTERNAL MEDICINE

## 2021-06-25 LAB — MISCELLANEOUS LAB TEST ORDER: NORMAL

## 2021-07-26 ENCOUNTER — HOSPITAL ENCOUNTER (OUTPATIENT)
Age: 51
Discharge: HOME | End: 2021-07-26
Payer: MEDICARE

## 2021-07-26 LAB
A/G RATIO: 1.5 (ref 1.1–2.2)
ALBUMIN SERPL-MCNC: 4.8 G/DL (ref 3.4–5)
ALP BLD-CCNC: 51 U/L (ref 40–129)
ALT SERPL-CCNC: 21 U/L (ref 10–40)
ANION GAP SERPL CALCULATED.3IONS-SCNC: 13 MMOL/L (ref 3–16)
AST SERPL-CCNC: 30 U/L (ref 15–37)
BASOPHILS ABSOLUTE: 0.1 K/UL (ref 0–0.2)
BASOPHILS RELATIVE PERCENT: 1.1 %
BILIRUB SERPL-MCNC: 0.6 MG/DL (ref 0–1)
BUN BLDV-MCNC: 10 MG/DL (ref 7–20)
CALCIUM SERPL-MCNC: 9.4 MG/DL (ref 8.3–10.6)
CHLORIDE BLD-SCNC: 101 MMOL/L (ref 99–110)
CHOLESTEROL, TOTAL: 193 MG/DL (ref 0–199)
CO2: 24 MMOL/L (ref 21–32)
CREAT SERPL-MCNC: 0.7 MG/DL (ref 0.6–1.1)
EOSINOPHILS ABSOLUTE: 0.1 K/UL (ref 0–0.6)
EOSINOPHILS RELATIVE PERCENT: 1.7 %
FERRITIN: 14.8 NG/ML (ref 15–150)
FOLATE: 19.09 NG/ML (ref 4.78–24.2)
GFR AFRICAN AMERICAN: >60
GFR NON-AFRICAN AMERICAN: >60
GLOBULIN: 3.3 G/DL
GLUCOSE BLD-MCNC: 83 MG/DL (ref 70–99)
HCT VFR BLD CALC: 37.4 % (ref 36–48)
HDLC SERPL-MCNC: 71 MG/DL (ref 40–60)
HEMOGLOBIN: 12.4 G/DL (ref 12–16)
IRON SATURATION: 31 % (ref 15–50)
IRON: 93 UG/DL (ref 37–145)
LDL CHOLESTEROL CALCULATED: 112 MG/DL
LYMPHOCYTES ABSOLUTE: 1.5 K/UL (ref 1–5.1)
LYMPHOCYTES RELATIVE PERCENT: 20.3 %
MAGNESIUM: 2.2 MG/DL (ref 1.8–2.4)
MCH RBC QN AUTO: 27.3 PG (ref 26–34)
MCHC RBC AUTO-ENTMCNC: 33.1 G/DL (ref 31–36)
MCV RBC AUTO: 82.7 FL (ref 80–100)
MONOCYTES ABSOLUTE: 0.5 K/UL (ref 0–1.3)
MONOCYTES RELATIVE PERCENT: 6.4 %
NEUTROPHILS ABSOLUTE: 5.1 K/UL (ref 1.7–7.7)
NEUTROPHILS RELATIVE PERCENT: 70.5 %
PDW BLD-RTO: 16.1 % (ref 12.4–15.4)
PLATELET # BLD: 233 K/UL (ref 135–450)
PLATELET SLIDE REVIEW: ADEQUATE
PMV BLD AUTO: 11.5 FL (ref 5–10.5)
POTASSIUM SERPL-SCNC: 4 MMOL/L (ref 3.5–5.1)
RBC # BLD: 4.52 M/UL (ref 4–5.2)
SODIUM BLD-SCNC: 138 MMOL/L (ref 136–145)
T3 FREE: 2.9 PG/ML (ref 2.3–4.2)
T4 FREE: 1.5 NG/DL (ref 0.9–1.8)
TOTAL IRON BINDING CAPACITY: 296 UG/DL (ref 260–445)
TOTAL PROTEIN: 8.1 G/DL (ref 6.4–8.2)
TRIGL SERPL-MCNC: 52 MG/DL (ref 0–150)
TSH SERPL DL<=0.05 MIU/L-ACNC: 0.88 UIU/ML (ref 0.27–4.2)
VITAMIN B-12: 779 PG/ML (ref 211–911)
VITAMIN D 25-HYDROXY: 50.7 NG/ML
VLDLC SERPL CALC-MCNC: 10 MG/DL
WBC # BLD: 7.3 K/UL (ref 4–11)

## 2021-07-26 PROCEDURE — 82607 VITAMIN B-12: CPT

## 2021-07-26 PROCEDURE — 83921 ORGANIC ACID SINGLE QUANT: CPT

## 2021-07-26 PROCEDURE — 85025 COMPLETE CBC W/AUTO DIFF WBC: CPT

## 2021-07-26 PROCEDURE — 83735 ASSAY OF MAGNESIUM: CPT

## 2021-07-26 PROCEDURE — 36415 COLL VENOUS BLD VENIPUNCTURE: CPT

## 2021-07-26 PROCEDURE — 84439 ASSAY OF FREE THYROXINE: CPT

## 2021-07-26 PROCEDURE — 84443 ASSAY THYROID STIM HORMONE: CPT

## 2021-07-26 PROCEDURE — 83036 HEMOGLOBIN GLYCOSYLATED A1C: CPT

## 2021-07-26 PROCEDURE — 84481 FREE ASSAY (FT-3): CPT

## 2021-07-26 PROCEDURE — 83550 IRON BINDING TEST: CPT

## 2021-07-26 PROCEDURE — 82728 ASSAY OF FERRITIN: CPT

## 2021-07-26 PROCEDURE — 80053 COMPREHEN METABOLIC PANEL: CPT

## 2021-07-26 PROCEDURE — 80061 LIPID PANEL: CPT

## 2021-07-26 PROCEDURE — 82746 ASSAY OF FOLIC ACID SERUM: CPT

## 2021-07-26 PROCEDURE — 82306 VITAMIN D 25 HYDROXY: CPT

## 2021-07-26 PROCEDURE — 83540 ASSAY OF IRON: CPT

## 2021-07-27 LAB
ESTIMATED AVERAGE GLUCOSE: 111.2 MG/DL
HBA1C MFR BLD: 5.5 %

## 2021-07-29 LAB — METHYLMALONIC ACID: 0.19 UMOL/L (ref 0–0.4)

## 2021-08-18 ENCOUNTER — TELEPHONE (OUTPATIENT)
Dept: PULMONOLOGY | Age: 51
End: 2021-08-18

## 2021-08-18 DIAGNOSIS — M34.9 SCLERODERMA (HCC): Primary | ICD-10-CM

## 2021-08-18 NOTE — TELEPHONE ENCOUNTER
Patient called requesting referral and records to be sent to Dr Nichole Winslow at [facility].

## 2021-09-07 ENCOUNTER — HOSPITAL ENCOUNTER (OUTPATIENT)
Dept: CARDIOLOGY | Age: 51
Discharge: HOME | End: 2021-09-07
Payer: MEDICARE

## 2021-09-07 DIAGNOSIS — R06.02 SOB (SHORTNESS OF BREATH): ICD-10-CM

## 2021-09-07 DIAGNOSIS — I27.21 PAH (PULMONARY ARTERY HYPERTENSION) (HCC): ICD-10-CM

## 2021-09-07 LAB
LV EF: 55 %
LVEF MODALITY: NORMAL

## 2021-09-07 PROCEDURE — 93306 TTE W/DOPPLER COMPLETE: CPT

## 2021-09-24 ENCOUNTER — HOSPITAL ENCOUNTER (OUTPATIENT)
Dept: VASCULAR LAB | Age: 51
Discharge: HOME | End: 2021-09-24
Payer: MEDICARE

## 2021-09-24 DIAGNOSIS — I72.4: ICD-10-CM

## 2021-09-24 PROCEDURE — 93925 LOWER EXTREMITY STUDY: CPT

## 2022-02-22 ENCOUNTER — OFFICE VISIT (OUTPATIENT)
Dept: CARDIOLOGY CLINIC | Age: 52
End: 2022-02-22
Payer: MEDICARE

## 2022-02-22 VITALS
DIASTOLIC BLOOD PRESSURE: 64 MMHG | WEIGHT: 132 LBS | SYSTOLIC BLOOD PRESSURE: 112 MMHG | OXYGEN SATURATION: 99 % | HEART RATE: 94 BPM | HEIGHT: 63 IN | BODY MASS INDEX: 23.39 KG/M2

## 2022-02-22 DIAGNOSIS — I27.21 PAH (PULMONARY ARTERY HYPERTENSION) (HCC): Primary | ICD-10-CM

## 2022-02-22 DIAGNOSIS — R06.02 SOB (SHORTNESS OF BREATH): ICD-10-CM

## 2022-02-22 DIAGNOSIS — M34.9 SCLERODERMA (HCC): ICD-10-CM

## 2022-02-22 DIAGNOSIS — I47.1 SVT (SUPRAVENTRICULAR TACHYCARDIA) (HCC): ICD-10-CM

## 2022-02-22 DIAGNOSIS — R07.9 CHEST PAIN, UNSPECIFIED TYPE: ICD-10-CM

## 2022-02-22 DIAGNOSIS — R00.2 PALPITATIONS: ICD-10-CM

## 2022-02-22 PROCEDURE — G8427 DOCREV CUR MEDS BY ELIG CLIN: HCPCS | Performed by: INTERNAL MEDICINE

## 2022-02-22 PROCEDURE — 1036F TOBACCO NON-USER: CPT | Performed by: INTERNAL MEDICINE

## 2022-02-22 PROCEDURE — 99214 OFFICE O/P EST MOD 30 MIN: CPT | Performed by: INTERNAL MEDICINE

## 2022-02-22 PROCEDURE — 3017F COLORECTAL CA SCREEN DOC REV: CPT | Performed by: INTERNAL MEDICINE

## 2022-02-22 PROCEDURE — G8420 CALC BMI NORM PARAMETERS: HCPCS | Performed by: INTERNAL MEDICINE

## 2022-02-22 PROCEDURE — G8484 FLU IMMUNIZE NO ADMIN: HCPCS | Performed by: INTERNAL MEDICINE

## 2022-02-22 RX ORDER — MYCOPHENOLIC ACID 180 MG/1
720 TABLET, DELAYED RELEASE ORAL 2 TIMES DAILY
COMMUNITY

## 2022-02-22 RX ORDER — SENNA PLUS 8.6 MG/1
TABLET ORAL
COMMUNITY

## 2022-02-22 NOTE — PROGRESS NOTES
[address]   Advanced Heart Failure/Pulmonary Hypertension  Cardiac Evaluation      Isacc Santa  YOB: 1970    Date of Visit:  2/22/22    Chief Complaint   Patient presents with    Follow-up    Tachycardia     SVT    Other     Pulmonary artery hypertension         History of Present Illness:   Isacc Santa is a 49 y.o. female was seen at the request of Dr. Lamonte Alonzo for consultation of [address]. She was seen by Dr. Lenny Santos in the past for her [address] but wants to switch to me. She has a complex history of PAH, Scleroderma, Alpha 1 antitrypsin deficiency, Raynauds and arthritis. She had thyroid dysfunction and diabetes with her pregnancy. She also has cervical stenosis. She is a mother of 5 children. She sees Dr. Maurilio Avilez yearly for scleroderma and gets yearly CT scans. She sees endocrinologist for thyroid problems and diabetes. Was having ptosis on her eye with double vision and was started on high dose prednisone. She wore a 24 hour monitor but no racing heart beats at the time she was wearing it. She was seen by EPO years ago and has tried a medication name unknown to slow the HR but her BP went too low and the medication was stopped. Family history includes GM with heart disease at age [de-identified]. Her other GM has a pacemaker. She underwent R & LHC on 6/1/2016 that revealed no CAD and no PHTN. She has been following with Dr Maurilio Avilez for scleroderma. She underwent an SVT ablation on 3/15/17. She wore a 48 hour holter monitor. Her preliminary echo from 4/11/17 shows normal function. On 4/5/18 she was sitting at her kitchen bench and fainted. She states she does not remember going down. She states this has never happened before. She wore a 48 hour holter monitor 4/13/18-4/18/18 which was within normal limits. She states she is getting a ultrasound of her thyroid on 4/18/18 and lab work. She had an Echo 04/17/18 showed EF of 60%.      She wore a AGNES from 5/29-6/27/18 that showed PAC's and PVC\"S. She states she has had no more episodes of syncope. She is following with Dr. Erickson Khanna for endocrinology. On 02/11/19 she reports she had left sided neck pain. About 2.5 weeks prior while working out on the treadmill, she had left side neck pain that radiates into the temple and she couldn't hear out of her left ear. She reports since then she has nerve pain and ongoing jaw pain. She reports the sensation \"flares up\" since then. She states the hearing loss lasted 30 mins. She has been taking ibuprofen 600 mg. She states it feels vascular and feels it may rupture. Her neck is tender to palpation. She has SOB with climbing steps which is not new. She reports occasional palpitations if she squats down. She takes an aspirin 81 mg daily. Her echo from 07/02/19 showed her EF was 55%. Mild AR, MR and NH. Her echo from 07/01/20 showed her EF was 55%. Mild MR, TR, and AR. SPAP 28 mmHg. PFT 8/2020 was unremarkable. On 09/01/20 she reported she was having palpitations again. She had a severe episode on 08/31/20 that was sudden onset. The episodes were getting stronger and more frequent. She reported she is having vascular issues in her legs with bruising and redness. Her toes on her left foot change colors to almost black. She reports her legs and feet are tender to touch. She reports her skin in her upper legs gets hot. Her cardiac monitor from 09/2020 showed SVT vs ST. She was to follow up with DR Naheed Dunn. Echo 9/2021 demonstrated LVEF of 55%. Today, patient reports still having episodes of tachycardia. She reports that she took three rounds of prednisone recently and had her thyroid medication adjusted and thinks this may be why she had tachycardia episodes. She follows with her endocrinologist for this. Her rheumatologist also believes that her scleroderma may be playing a part with the tachycardia. Patient's labs from 12/2021 show normal CBC, normal CMP, and high LDL(107).  Patient is taking all cardiac medications as prescribed and tolerates them well. Dr. Ruchi Rodriguez prescribed mycophenolate for her scleroderma. Patient denies current edema, chest pain, sob, dizziness or syncope. Allergies   Allergen Reactions    Cymbalta [Duloxetine Hcl]      Swelling of face    Imuran [Azathioprine] Nausea And Vomiting    Iron Supplement [Ferrous Sulfate] Shortness Of Breath     thinks it was from an additive    Spectracef [Cefditoren Pivoxil] Shortness Of Breath, Itching and Swelling    Bactrim Rash    Cephalosporins     Other      TREE NUTS  APPLES    Proventil [Albuterol]     Robaxin [Methocarbamol]     Tramadol Itching     Mouth Itch    Ciprofloxacin Swelling and Rash    Dilaudid [Hydromorphone] Nausea And Vomiting     Current Outpatient Medications   Medication Sig Dispense Refill    mycophenolate (MYFORTIC) 180 MG DR tablet Take 360 mg by mouth 2 times daily       MAGNESIUM OXIDE PO Take 1,000 mg by mouth daily      Cetirizine HCl 10 MG CAPS Take by mouth      senna (SENOKOT) 8.6 MG tablet Take by mouth      VITAMIN D, CHOLECALCIFEROL, PO Take by mouth      Calcium Carbonate-Vitamin D (CALTRATE 600+D PO) Take by mouth      levothyroxine (SYNTHROID) 75 MCG tablet Take 75 mcg by mouth Daily       betamethasone dipropionate (DIPROLENE) 0.05 % ointment Apply sparingly to affected area(s) bid prn for flares, up to 2 weeks at a time on any given area. Do not apply on cleared skin. 45 g 0    hydroxychloroquine (PLAQUENIL) 200 MG tablet TAKE ONE TABLET BY MOUTH TWICE A DAY 60 tablet 11    gabapentin (NEURONTIN) 100 MG capsule Take 100 mg by mouth 2 times daily. Vadim Boudreaux diclofenac sodium 1 % GEL Apply 2 g topically 4 times daily as needed for Pain 1 Tube 5    promethazine (PHENERGAN) 12.5 MG tablet TAKE ONE TABLET BY MOUTH EVERY 6 HOURS AS NEEDED FOR NAUSEA      fluticasone (FLONASE) 50 MCG/ACT nasal spray 1 spray by Nasal route daily      pyridostigmine (MESTINON) 60 MG tablet Take 60 mg by mouth 3 times daily       omeprazole (PRILOSEC) 20 MG capsule Take 40 mg by mouth daily       aspirin 81 MG EC tablet Take 81 mg by mouth daily. No current facility-administered medications for this visit. Past Medical History:   Diagnosis Date    Alpha-1-antitrypsin deficiency ([address]     Blood circulation, collateral rynaud's disease    Cardiac arrhythmia     Clostridium difficile diarrhea     DNA probe positive 8/7/11    Colitis 7/2011    Salmonela positive    Diarrhea     Fibromyalgia     GERD (gastroesophageal reflux disease)     irritable bowel    Hashimoto thyroiditis     History of recurrent miscarriages     3    Incompetent cervix     3 cervical procedures.  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 one antitrypsin deficiency    Interstitial cystitis     Interstitial cystitis 6/2011    Liver disease     alpha I anti-trypsin disease    Lyme disease 2003    Mastocytosis     Peripheral neuropathy     Pregnancy in a diabetic mother     Rash     Raynaud disease     Reactive hypoglycemia     Scleroderma ([address] 2008    Thyroid dysfunction in pregnancy     Thyroiditis     Postpartum, resolved    Type II diabetes mellitus with neurological manifestations ([address]      Past Surgical History:   Procedure Laterality Date    ABLATION OF DYSRHYTHMIC FOCUS  03/2017    COLONOSCOPY  7/29/2011    CYSTOSCOPY  7/2011    DIAGNOSTIC CARDIAC CATH LAB PROCEDURE      DILATION AND CURETTAGE OF UTERUS      MOHS SURGERY Right 10/2015    VASC UPPER EXTREMITY VENOUS  UNI  01/2017    Left lower Leg    VENTRICULAR ABLATION SURGERY      VESICOVAGINAL FISTULA REPAIR  04/2017     Family History   Problem Relation Age of Onset    Cancer Maternal Grandfather         colon cancer    Thyroid Disease Mother     Diabetes Maternal Grandmother     Heart Disease Maternal Grandmother     Thyroid Disease Maternal Grandmother     Cancer Maternal Grandmother         melanoma    Other Daughter connective tissues disease    Cancer Maternal Uncle         melanoma    High Cholesterol Neg Hx     High Blood Pressure Neg Hx      Social History     Socioeconomic History    Marital status:      Spouse name: Not on file    Number of children: Not on file    Years of education: Not on file    Highest education level: Not on file   Occupational History    Not on file   Tobacco Use    Smoking status: Never Smoker    Smokeless tobacco: Never Used   Vaping Use    Vaping Use: Never used   Substance and Sexual Activity    Alcohol use: No     Alcohol/week: 0.0 standard drinks    Drug use: No    Sexual activity: Not on file   Other Topics Concern    Not on file   Social History Narrative    Not on file     Social Determinants of Health     Financial Resource Strain:     Difficulty of Paying Living Expenses: Not on file   Food Insecurity:     Worried About Running Out of Food in the Last Year: Not on file    Rupinder of Food in the Last Year: Not on file   Transportation Needs:     Lack of Transportation (Medical): Not on file    Lack of Transportation (Non-Medical):  Not on file   Physical Activity:     Days of Exercise per Week: Not on file    Minutes of Exercise per Session: Not on file   Stress:     Feeling of Stress : Not on file   Social Connections:     Frequency of Communication with Friends and Family: Not on file    Frequency of Social Gatherings with Friends and Family: Not on file    Attends [employer] Services: Not on file    Active Member of Clubs or Organizations: Not on file    Attends Club or Organization Meetings: Not on file    Marital Status: Not on file   Intimate Partner Violence:     Fear of Current or Ex-Partner: Not on file    Emotionally Abused: Not on file    Physically Abused: Not on file    Sexually Abused: Not on file   Housing Stability:     Unable to Pay for Housing in the Last Year: Not on file    Number of [place] in the Last Year: Not on file  Unstable Housing in the Last Year: Not on file       Review of Systems:   · Constitutional: there has been no unanticipated weight loss. There's been no change in energy level, sleep pattern, or activity level. · Eyes: No visual changes or diplopia. No scleral icterus. · ENT: No Headaches, hearing loss or vertigo. No mouth sores or sore throat. · Cardiovascular: Reviewed in HPI  · Respiratory: No cough or wheezing, no sputum production. No hematemesis. · Gastrointestinal: No abdominal pain, appetite loss, blood in stools. No change in bowel or bladder habits. · Genitourinary: No dysuria, trouble voiding, or hematuria. · Musculoskeletal:  No gait disturbance, weakness or joint complaints. · Integumentary: No rash or pruritis. · Neurological: No headache, diplopia, change in muscle strength, numbness or tingling. No change in gait, balance, coordination, mood, affect, memory, mentation, behavior. · Psychiatric: No anxiety, no depression. · Endocrine: No malaise, fatigue or temperature intolerance. No excessive thirst, fluid intake, or urination. No tremor. · Hematologic/Lymphatic: No abnormal bruising or bleeding, blood clots or swollen lymph nodes. · Allergic/Immunologic: No nasal congestion or hives. Physical Examination:    Vitals:    02/22/22 1115   BP: 112/64   Pulse: 94   SpO2: 99%   Weight: 132 lb (59.9 kg)   Height: 5' 3\" (1.6 m)     Body mass index is 23.38 kg/m².      Wt Readings from Last 3 Encounters:   02/22/22 132 lb (59.9 kg)   06/21/21 128 lb 6.4 oz (58.2 kg)   03/02/21 133 lb (60.3 kg)     BP Readings from Last 3 Encounters:   02/22/22 112/64   06/21/21 124/74   03/02/21 106/60          Constitutional and General Appearance:   WD/WN in NAD  HEENT:  NC/AT  Skin:  Warm, dry  Respiratory:  · Normal excursion and expansion without use of accessory muscles  · Resp Auscultation: Normal breath sounds without dullness  Cardiovascular:  · The apical impulses not displaced  · Heart tones are crisp and normal  · Cervical veins are not engorged  · The carotid upstroke is normal in amplitude and contour without delay or bruit  · JVP normal  RRR with nl S1 and S2 without m,r,g  · Peripheral pulses are symmetrical and full  · There is no clubbing, cyanosis of the extremities. · No edema  · Femoral Arteries: 2+ and equal  · Pedal Pulses: 2+ and equal   Neck:  · JVP less than 8 cm H2O  · No thyromegaly  Abdomen:  · No masses or tenderness  · Liver/Spleen: No Abnormalities Noted  Neurological/Psychiatric:  · Alert and oriented in all spheres  · Moves all extremities well  · Exhibits normal gait balance and coordination  · No abnormalities of mood, affect, memory, mentation, or behavior are noted          CATH: 6/1/2016  PA 18/6 TZ-by echo RVSP 47-4/16  ~Findings  Normal pulmonary pressures and outputs  Normal coronary arteries   Recommendation  ~Aggressive medical treatment and risk factor modification      GXT Myoview-4/19/2016  Normal LV size and systolic function. Left ventricular ejection fraction of   70 %. Normal wall motion. There is a small anterolateral defect that is   present at rest and worse at stress concerning for ischemia. However cannot   rule out artifact from breast attenuation and increased bowel uptake of   radio tracer. Echo: 07/02/19  Normal left ventricle systolic function with an estimated ejection fraction   of 55%. No regional wall motion abnormalities are seen. Normal left ventricular diastolic filling pressure. Mild aortic regurgitation. Mild mitral and tricuspid regurgitation. Systolic pulmonary artery pressure (SPAP) is normal and estimated at 23 mmHg   (right atrial pressure 3 mmHg). Echo: 07/01/20  Summary   Left ventricular systolic function is normal with a visually estimated   ejection fraction of 55%. No regional wall motion abnormalities are noted. Normal left ventricular diastolic function. Mild mitral, tricuspid and aortic regurgitation. Systolic pulmonary artery pressure (SPAP) is normal and estimated at 28 mmHg   (right atrial pressure 3 mmHg). Lipids: 10/09/18: , TG 51, HDL 82, LDL 93    Cardiac monitor: 09/2020   SVT vs ST. Labs were reviewed including labs from other hospital systems through [facility]. Cardiac testing was reviewed including echos, nuclear scans, cardiac catheterization, including from other hospital systems through [facility]. ECHO 9/2021:    Summary   Normal left ventricle systolic function with an estimated ejection fraction   of 55%. No regional wall motion abnormalities are seen. Normal left ventricular diastolic filling pressure. Mild mitral, aortic and tricuspid regurgitation. Systolic pulmonary artery pressure (SPAP) is normal and estimated at 22 mmHg   (right atrial pressure 3 mmHg). No change from last echo on 7/1/2020. Labs were reviewed including labs from other hospital systems through [facility]. Cardiac testing was reviewed including echos, nuclear scans, cardiac catheterization, including from other hospital systems through [facility]. Assessment:  1. PAH (pulmonary artery hypertension) ([address]    2. SOB (shortness of breath)    3. Palpitations    4. Scleroderma (HCC)    5. Chest pain, unspecified type    6. SVT (supraventricular tachycardia) ([address]      She does not appear to have any evidence of PAH. Continue surveillance    Patient's labs from 12/2021 show normal CBC, normal CMP, and high LDL(107). Plan:   1. Echo and office visit in 6 months. 2. Continue all cardiac medications as prescribed. This note has been scribed in the presence of Madison Swain MD by Cary Jurado RN. The scribe's documentation has been prepared under my direction and personally reviewed by me in its entirety. I confirm that the note above accurately reflects all work, treatment, procedures, and medical decision making performed by me.       Time Based Itemization  A total of 30 minutes was spent on today's patient encounter. If applicable, non-patient-facing activities:  ( x)Preparing to see the patient and reviewing records  ( ) Individual interpretation of results  ( ) Discussion or coordination of care with other health care professionals  ( x) Ordering of unique tests, medications, or procedures  ( x) Documentation within the EHR      I appreciate the opportunity of cooperating in the care of this patient.     Pricilla Bhandari M.D., [facility] [place]

## 2022-04-22 ENCOUNTER — TELEPHONE (OUTPATIENT)
Dept: CARDIOLOGY CLINIC | Age: 52
End: 2022-04-22

## 2022-04-22 DIAGNOSIS — I47.1 SVT (SUPRAVENTRICULAR TACHYCARDIA) (HCC): Primary | ICD-10-CM

## 2022-04-22 NOTE — TELEPHONE ENCOUNTER
Pt states over the last 2 weeks her heart has been consistently  going out of rhythm. First avail appt was 5/23/22. Is that appropriate time frame and should pt have a holter monitor prior to being seen. Please advise.

## 2022-04-25 ENCOUNTER — TELEPHONE (OUTPATIENT)
Dept: CARDIOLOGY CLINIC | Age: 52
End: 2022-04-25

## 2022-04-25 ENCOUNTER — NURSE ONLY (OUTPATIENT)
Dept: CARDIOLOGY CLINIC | Age: 52
End: 2022-04-25

## 2022-04-25 DIAGNOSIS — I47.1 SVT (SUPRAVENTRICULAR TACHYCARDIA) (HCC): ICD-10-CM

## 2022-04-25 NOTE — TELEPHONE ENCOUNTER
Pt presented herself at [facility] office for 48 hour monitor placement. 48 hour CAM/Bardy monitor ordered for pt  Orderd by: Loni Mohs, MD  Date ordered: 4/25/22  Place:  [facility] office  Placed by: Riddhi Simmons LPN      CAM ID: [id number]

## 2022-05-09 PROCEDURE — 93227 XTRNL ECG REC<48 HR R&I: CPT | Performed by: INTERNAL MEDICINE

## 2022-05-18 DIAGNOSIS — I47.1 AVNRT (AV NODAL RE-ENTRY TACHYCARDIA) (HCC): Primary | ICD-10-CM

## 2022-05-20 ENCOUNTER — TELEPHONE (OUTPATIENT)
Dept: CARDIOLOGY CLINIC | Age: 52
End: 2022-05-20

## 2022-05-20 NOTE — TELEPHONE ENCOUNTER
Called and spoke to the pt and gave her message below and she is still having issues and told her that if she wants to keep her appt then she should come in and because I gave her message below she is now refusing to keep her appt.  So I tod the pt that I would cancel it and she would follow up with BAKARI in 6 months

## 2022-05-20 NOTE — TELEPHONE ENCOUNTER
Please let the patient know her monitor was normal and she can cancel her appointment on Monday and reschedule her to see EPNP at [place] as that is her preferred location

## 2022-05-20 NOTE — TELEPHONE ENCOUNTER
Cherelle Silva with patient today and she would like to keep her appointment with Dr. Germaine Elizabeth for Monday 5/23.   kevin

## 2022-05-20 NOTE — PROGRESS NOTES
[address]   Electrophysiology Follow up   Date: 5/23/2022  I had the privilege of visiting Sophie Ortega in the office. CC: SVT  HPI: Sophie Ortega is a 49 y.o. female with a past medical history of DM, scleroderma, thyroid issues, alpha 1 antitrypsin deficiency, fibromyalgia follows with rheumatology, and SVT. She is s/p SVT/AVNRT ablation(3/15/2017). S/p RHC and LHC 6/1/2016 that revealed no CAD and no PHTN. Follows with Dr. Manuel Tavera and has had syncopal episode, wore Holter monitor that showed PVC/PAC with skipped beat symptom. Syncope felt to be vasovagal.      [employer] Monitor 9/1/2020-10/1/2020 that showed avg HR 75 (), ST vs SVT (likely ST), Short PAT, Symptoms with SR, PACs, and brief atrial run. 48 hour holter 4/25/2022 showed SR and symptoms with NSR     Nick Flynn presents to the office in follow up. She states she is having new symptoms of palpitations that feel different then PVC's or her SVT in the past. She states when she exercises on the treadmill her palpitations are extremely painful. With position changes she states her heart gets rapid immediately when standing. She has been off and on prednisone and has developed anemia. Assessment and plan:   -Palpitations? Better now that her thyroid medications have been adjusted. She was hyperthyroid. There episodes are different than her prior episodes, short duration and not consistent. She was also on prednisone    Holter with no sustained arrhythmia   She has had sinus tachycardia with exercise.       If episodes become more frequent we can do a longer monitor (at least 2 weeks)     -SVT   ECG today shows Sinus Rhythm   S/p SVT/AVNRT ablation 3/15/2017    -Syncope   Hx of   Thought to be vasovagal in nature   No recurrence    -Pulmonary HTN   Managed by    Per  she does not have any evidence of [facility] 2/28/2022      Patient Active Problem List    Diagnosis Date Noted    Scleroderma ([address]     Sicca syndrome ([facility] 06/29/2011    Relapsing polychondritis 02/14/2019    Syncope 04/17/2018    Cervical pain (neck) 09/05/2017    Neuropathy 04/28/2017    AVNRT (AV richmond re-entry tachycardia) ([facility]     PAH (pulmonary artery hypertension) (HCC)     SVT (supraventricular tachycardia) ([facility] 01/31/2017    High risk medication use 01/30/2017    Abnormal stress test 06/01/2016    Acquired hypothyroidism 05/27/2016    Current use of steroid medication 05/27/2016    Precordial pain 05/17/2016    Palpitations 04/08/2016    SOB (shortness of breath) 04/08/2016    Basal cell carcinoma of right cheek 10/06/2015    Vitamin D deficiency 12/05/2013    Changes in skin texture 03/18/2013    Hashimoto's disease 01/14/2013    Type 2 diabetes mellitus (HCC) 01/14/2013    Hypoglycemia 01/14/2013    Elevated LFTs 01/14/2013    Peripheral neuropathy     Diarrhea 08/07/2011    Leucocytosis 08/07/2011    Colitis 07/29/2011    Pityriasis versicolor 06/29/2011     Diagnostic studies:   ECG 5/23/22  SR    Echo 9/7/2021  Summary  Normal left ventricle systolic function with an estimated ejection fraction of 55%. No regional wall motion abnormalities are seen. Normal left ventricular diastolic filling pressure. Mild mitral, aortic and tricuspid regurgitation. Systolic pulmonary artery pressure (SPAP) is normal and estimated at 22 mmHg (right atrial pressure 3 mmHg). No change from last echo on 7/1/2020. Echo 7/2/19   Summary   Normal left ventricle systolic function with an estimated ejection fraction   of 55%. No regional wall motion abnormalities are seen.   Normal left ventricular diastolic filling pressure.   Mild aortic regurgitation.   Mild mitral and tricuspid regurgitation.   Systolic pulmonary artery pressure (SPAP) is normal and estimated at 23 mmHg   (right atrial pressure 3 mmHg).      Echo: 4/19/16  Summary  Normal left ventricle size,wall thickness and systolic function with an  estimated ejection fraction of 55-60%. No regional wall abnormalites are seen. Normal diastolic filling pattern for age. Mitral valve is structurally normal. No mitral stenosis. Mild Mitral regurgitation. The aortic valve is structurally normal.No aortic stenosis. Trace aortic regurgitation. Tricuspid valve is structurally normal.  Mild tricuspid regurgitation. Systolic pulmonary artery pressure (SPAP) is estimated at 47 mmHg consistent  with mild pulmonary hypertension (RA pressure 8 mmHg). IVC is normal in size (< 2.1 cm) and collapses < 50% with respiration  consistent with elevated RA pressure (8 mmHg). no intracardiac mass, vegetations or thrombi.     Nuclear stress test: 4/19/16. Summary   Normal LV size and systolic function. Left ventricular ejection fraction of   70 %. Normal wall motion. There is a small anterolateral defect that is   present at rest and worse at stress concerning for ischemia. However cannot   rule out artifact from breast attenuation and increased bowel uptake of   radio tracer. LHC/RHC 6/1/2016  Intervention  ~None     ~Findings  Normal pulmonary pressures and outputs  Normal coronary arteries  I independently reviewed the cardiac diagnostic studies, ECG and relevant imaging studies. Lab Results   Component Value Date    LVEF 55 09/07/2021     Lab Results   Component Value Date    TSH 0.88 07/26/2021         Physical Examination:  Vitals:    05/23/22 1611   BP: 132/77   Pulse:    SpO2:       Wt Readings from Last 3 Encounters:   05/23/22 131 lb (59.4 kg)   02/22/22 132 lb (59.9 kg)   06/21/21 128 lb 6.4 oz (58.2 kg)       · Constitutional: Oriented. No distress. · Head: Normocephalic and atraumatic. · Mouth/Throat: Oropharynx is clear and moist.   · Eyes: Conjunctivae normal. EOM are normal.   · Neck: Neck supple. No JVD present. · Cardiovascular: Normal rate, regular rhythm, S1&S2. · Pulmonary/Chest: Bilateral respiratory sounds. No rhonchi. · Abdominal: Soft.  No tenderness. · Musculoskeletal: No tenderness. No edema    · Lymphadenopathy: Has no cervical adenopathy. · Neurological: Alert and oriented. Follows command, No Gross deficit   · Skin: Skin is warm, No rash noted. · Psychiatric: Has a normal behavior       Review of System:  [x] Full ROS obtained and negative except as mentioned in HPI    Prior to Admission medications    Medication Sig Start Date End Date Taking? Authorizing Provider   mycophenolate (MYFORTIC) 180 MG DR tablet Take 360 mg by mouth 2 times daily    Yes Historical Provider, MD   MAGNESIUM OXIDE PO Take 1,000 mg by mouth daily   Yes Historical Provider, MD   Cetirizine HCl 10 MG CAPS Take by mouth   Yes Historical Provider, MD   senna (SENOKOT) 8.6 MG tablet Take by mouth   Yes Historical Provider, MD   VITAMIN D, CHOLECALCIFEROL, PO Take by mouth   Yes Historical Provider, MD   Calcium Carbonate-Vitamin D (CALTRATE 600+D PO) Take by mouth   Yes Historical Provider, MD   levothyroxine (SYNTHROID) 75 MCG tablet Take 75 mcg by mouth Daily    Yes Historical Provider, MD   betamethasone dipropionate (DIPROLENE) 0.05 % ointment Apply sparingly to affected area(s) bid prn for flares, up to 2 weeks at a time on any given area. Do not apply on cleared skin. 1/24/19  Yes Milton Urena MD   hydroxychloroquine (PLAQUENIL) 200 MG tablet TAKE ONE TABLET BY MOUTH TWICE A DAY 12/26/18  Yes Sarahi Dunn MD   gabapentin (NEURONTIN) 100 MG capsule Take 100 mg by mouth 2 times daily. .   Yes Historical Provider, MD   diclofenac sodium 1 % GEL Apply 2 g topically 4 times daily as needed for Pain 8/14/18  Yes Sarahi Dunn MD   promethazine (PHENERGAN) 12.5 MG tablet TAKE ONE TABLET BY MOUTH EVERY 6 HOURS AS NEEDED FOR NAUSEA 1/29/18  Yes Historical Provider, MD   fluticasone (FLONASE) 50 MCG/ACT nasal spray 1 spray by Nasal route daily   Yes Historical Provider, MD   pyridostigmine (MESTINON) 60 MG tablet Take 60 mg by mouth 3 times daily  10/26/16  Yes Historical Provider, MD   omeprazole (PRILOSEC) 20 MG capsule Take 40 mg by mouth daily    Yes Historical Provider, MD   aspirin 81 MG EC tablet Take 81 mg by mouth daily. Yes Historical Provider, MD       Allergies   Allergen Reactions    Cymbalta [Duloxetine Hcl]      Swelling of face    Imuran [Azathioprine] Nausea And Vomiting    Iron Supplement [Ferrous Sulfate] Shortness Of Breath     thinks it was from an additive    Spectracef [Cefditoren Pivoxil] Shortness Of Breath, Itching and Swelling    Bactrim Rash    Cephalosporins     Other      TREE NUTS  APPLES    Proventil [Albuterol]     Robaxin [Methocarbamol]     Tramadol Itching     Mouth Itch    Ciprofloxacin Swelling and Rash    Dilaudid [Hydromorphone] Nausea And Vomiting       Social History:  Reviewed. reports that she has never smoked. She has never used smokeless tobacco. She reports that she does not drink alcohol and does not use drugs. Family History:  Reviewed. Reviewed. No family history of SCD. Relevant and available labs, and cardiovascular diagnostics reviewed. Reviewed. I independently reviewed relevant and available cardiac diagnostic tests ECG, CXR, Echo, Stress test, Device interrogation, Holter, CT scan. Complex medical condition with multiple medical problems affecting prognosis and outcome of EP interventions    All questions and concerns were addressed to the patient/family. Alternatives to my treatment were discussed. I have discussed the above stated plan and the patient verbalized understanding and agreed with the plan. Scribe attestation: This note was scribed in the presence of Matthew Venegas MD by Jose Méndez, RN  Physician Attestation: I, Dr. Matthew Venegas, confirm that the scribe's documentation has been prepared under my direction and personally reviewed by me in its entirety.   I also confirm that the note above accurately reflects all work, treatment, procedures, and medical decision making performed by me.      NOTE: This report was transcribed using voice recognition software. Every effort was made to ensure accuracy, however, inadvertent computerized transcription errors may be present.      Kyler Houston MD, MPH  [place] [id number]   Office: [phone]  Fax: [phone]

## 2022-05-23 ENCOUNTER — OFFICE VISIT (OUTPATIENT)
Dept: CARDIOLOGY CLINIC | Age: 52
End: 2022-05-23
Payer: MEDICARE

## 2022-05-23 VITALS
HEART RATE: 82 BPM | SYSTOLIC BLOOD PRESSURE: 132 MMHG | DIASTOLIC BLOOD PRESSURE: 77 MMHG | WEIGHT: 131 LBS | HEIGHT: 63 IN | OXYGEN SATURATION: 99 % | BODY MASS INDEX: 23.21 KG/M2

## 2022-05-23 DIAGNOSIS — I47.1 AVNRT (AV NODAL RE-ENTRY TACHYCARDIA) (HCC): ICD-10-CM

## 2022-05-23 DIAGNOSIS — I47.1 SVT (SUPRAVENTRICULAR TACHYCARDIA) (HCC): Primary | ICD-10-CM

## 2022-05-23 PROCEDURE — 99214 OFFICE O/P EST MOD 30 MIN: CPT | Performed by: INTERNAL MEDICINE

## 2022-05-23 PROCEDURE — 93000 ELECTROCARDIOGRAM COMPLETE: CPT | Performed by: INTERNAL MEDICINE

## 2022-05-23 PROCEDURE — G8427 DOCREV CUR MEDS BY ELIG CLIN: HCPCS | Performed by: INTERNAL MEDICINE

## 2022-05-23 PROCEDURE — G8420 CALC BMI NORM PARAMETERS: HCPCS | Performed by: INTERNAL MEDICINE

## 2022-05-23 PROCEDURE — 3017F COLORECTAL CA SCREEN DOC REV: CPT | Performed by: INTERNAL MEDICINE

## 2022-05-23 PROCEDURE — 1036F TOBACCO NON-USER: CPT | Performed by: INTERNAL MEDICINE

## 2022-08-19 NOTE — PROGRESS NOTES
[address]   Advanced Heart Failure/Pulmonary Hypertension  Cardiac Evaluation      Romain Cook  YOB: 1970    Date of Visit:  8/23/22    Chief Complaint   Patient presents with    Follow-up    Other     Pulmonary HTN           History of Present Illness:   Romain Cook is a 47 y.o. female here for follow up. She was originally seen at the request of Dr. Richard Cleveland for consultation of [address]. She was seen by Dr. Kaylah James in the past for her [address] but wants to switch to me. She has a complex history of PAH, Scleroderma, Alpha 1 antitrypsin deficiency, Raynauds and arthritis. She had thyroid dysfunction and diabetes with her pregnancy. She also has cervical stenosis. She is a mother of 5 children. She sees Dr. Shital Neal yearly for scleroderma and gets yearly CT scans. She sees endocrinologist for thyroid problems and diabetes. Was having ptosis on her eye with double vision and was started on high dose prednisone. She wore a 24 hour monitor but no racing heart beats at the time she was wearing it. She was seen by EPO years ago and has tried a medication name unknown to slow the HR but her BP went too low and the medication was stopped. Family history includes GM with heart disease at age [de-identified]. Her other GM has a pacemaker. She underwent R & LHC on 6/1/2016 that revealed no CAD and no PHTN. She has been following with Dr Shital Neal for scleroderma. She underwent an SVT ablation on 3/15/17. She wore a 48 hour holter monitor. Her preliminary echo from 4/11/17 shows normal function. On 4/5/18 she was sitting at her kitchen bench and fainted. She states she does not remember going down. She states this has never happened before. She wore a 48 hour holter monitor 4/13/18-4/18/18 which was within normal limits. She states she is getting a ultrasound of her thyroid on 4/18/18 and lab work. She had an Echo 04/17/18 showed EF of 60%. She wore a AGNES from 5/29-6/27/18 that showed PAC's and PVC\"S. She states she has had no more episodes of syncope. She is following with Dr. Bronwyn Corrigan for endocrinology. On 02/11/19 she reports she had left sided neck pain. About 2.5 weeks prior while working out on the treadmill, she had left side neck pain that radiates into the temple and she couldn't hear out of her left ear. She reports since then she has nerve pain and ongoing jaw pain. She reports the sensation \"flares up\" since then. She states the hearing loss lasted 30 mins. She has been taking ibuprofen 600 mg. She states it feels vascular and feels it may rupture. Her neck is tender to palpation. She has SOB with climbing steps which is not new. She reports occasional palpitations if she squats down. She takes an aspirin 81 mg daily. Her echo from 07/02/19 showed her EF was 55%. Mild AR, MR and AZ. Her echo from 07/01/20 showed her EF was 55%. Mild MR, TR, and AR. SPAP 28 mmHg. PFT 8/2020 was unremarkable. On 09/01/20 she reported she was having palpitations again. She had a severe episode on 08/31/20 that was sudden onset. The episodes were getting stronger and more frequent. She reported she is having vascular issues in her legs with bruising and redness. Her toes on her left foot change colors to almost black. She reports her legs and feet are tender to touch. She reports her skin in her upper legs gets hot. Her cardiac monitor from 09/2020 showed SVT vs ST. She was to follow up with DR Jerman Hunter. Echo 9/2021 demonstrated LVEF of 55%. OV 2/22/2022, patient reports still having episodes of tachycardia. She reports that she took three rounds of prednisone recently and had her thyroid medication adjusted and thinks this may be why she had tachycardia episodes. She follows with her endocrinologist for this. Her rheumatologist also believes that her scleroderma may be playing a part with the tachycardia. Patient's labs from 12/2021 show normal CBC, normal CMP, and high LDL(107).     Today, 8/23/2022, she says she is doing well from a cardiac standpoint. She has started to get spontaneous busting of blood vessels from scleroderma. She says she noticed this when she was sitting in a recliner and her foot swelled and the blood vessel bursted. Her endocrinologist increased prednisone. She has noticed increased blood sugar and cholesterol. Patient is taking all cardiac medications as prescribed and tolerates them well. Patient denies current edema, chest pain, sob, palpitations, dizziness or syncope. Allergies   Allergen Reactions    Cymbalta [Duloxetine Hcl]      Swelling of face    Imuran [Azathioprine] Nausea And Vomiting    Iron Supplement [Ferrous Sulfate] Shortness Of Breath     thinks it was from an additive    Spectracef [Cefditoren Pivoxil] Shortness Of Breath, Itching and Swelling    Bactrim Rash    Cephalosporins     Other      TREE NUTS  APPLES    Proventil [Albuterol]     Robaxin [Methocarbamol]     Tramadol Itching     Mouth Itch    Ciprofloxacin Swelling and Rash    Dilaudid [Hydromorphone] Nausea And Vomiting     Current Outpatient Medications   Medication Sig Dispense Refill    predniSONE (DELTASONE) 10 MG tablet       mycophenolate (MYFORTIC) 180 MG DR tablet Take 720 mg by mouth 2 times daily      MAGNESIUM OXIDE PO Take 200 mg by mouth daily      Cetirizine HCl 10 MG CAPS Take by mouth      senna (SENOKOT) 8.6 MG tablet Take by mouth      VITAMIN D, CHOLECALCIFEROL, PO Take by mouth      Calcium Carbonate-Vitamin D (CALTRATE 600+D PO) Take by mouth      levothyroxine (SYNTHROID) 75 MCG tablet Take 75 mcg by mouth Daily       betamethasone dipropionate (DIPROLENE) 0.05 % ointment Apply sparingly to affected area(s) bid prn for flares, up to 2 weeks at a time on any given area. Do not apply on cleared skin. 45 g 0    hydroxychloroquine (PLAQUENIL) 200 MG tablet TAKE ONE TABLET BY MOUTH TWICE A DAY 60 tablet 11    gabapentin (NEURONTIN) 100 MG capsule Take 100 mg by mouth 2 times daily. Vearl Pippins diclofenac sodium 1 % GEL Apply 2 g topically 4 times daily as needed for Pain 1 Tube 5    promethazine (PHENERGAN) 12.5 MG tablet TAKE ONE TABLET BY MOUTH EVERY 6 HOURS AS NEEDED FOR NAUSEA      fluticasone (FLONASE) 50 MCG/ACT nasal spray 1 spray by Nasal route daily      pyridostigmine (MESTINON) 60 MG tablet Take 60 mg by mouth 3 times daily       omeprazole (PRILOSEC) 20 MG capsule Take 40 mg by mouth daily       aspirin 81 MG EC tablet Take 81 mg by mouth daily. No current facility-administered medications for this visit. Past Medical History:   Diagnosis Date    Alpha-1-antitrypsin deficiency ([facility] [address]     Blood circulation, collateral rynaud's disease    Cardiac arrhythmia     Clostridium difficile diarrhea     DNA probe positive 8/7/11    Colitis 7/2011    Salmonela positive    Diarrhea     Fibromyalgia     GERD (gastroesophageal reflux disease)     irritable bowel    Hashimoto thyroiditis     History of recurrent miscarriages     3    Incompetent cervix     3 cervical procedures.  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 one antitrypsin deficiency    Interstitial cystitis     Interstitial cystitis 6/2011    Liver disease     alpha I anti-trypsin disease    Lyme disease 2003    Mastocytosis     Peripheral neuropathy     Pregnancy in a diabetic mother     Rash     Raynaud disease     Reactive hypoglycemia     Scleroderma ([facility] [address] 2008    Thyroid dysfunction in pregnancy     Thyroiditis     Postpartum, resolved    Type II diabetes mellitus with neurological manifestations [facility])      Past Surgical History:   Procedure Laterality Date    ABLATION OF DYSRHYTHMIC FOCUS  03/2017    COLONOSCOPY  7/29/2011    CYSTOSCOPY  7/2011    DIAGNOSTIC CARDIAC CATH LAB PROCEDURE      DILATION AND CURETTAGE OF UTERUS      MOHS SURGERY Right 10/2015    VASC UPPER EXTREMITY VENOUS  UNI  01/2017    Left lower Leg    VENTRICULAR ABLATION SURGERY      VESICOVAGINAL FISTULA REPAIR  04/2017     Family History Problem Relation Age of Onset    Cancer Maternal Grandfather         colon cancer    Thyroid Disease Mother     Diabetes Maternal Grandmother     Heart Disease Maternal Grandmother     Thyroid Disease Maternal Grandmother     Cancer Maternal Grandmother         melanoma    Other Daughter         connective tissues disease    Cancer Maternal Uncle         melanoma    High Cholesterol Neg Hx     High Blood Pressure Neg Hx      Social History     Socioeconomic History    Marital status:      Spouse name: Not on file    Number of children: Not on file    Years of education: Not on file    Highest education level: Not on file   Occupational History    Not on file   Tobacco Use    Smoking status: Never    Smokeless tobacco: Never   Vaping Use    Vaping Use: Never used   Substance and Sexual Activity    Alcohol use: No     Alcohol/week: 0.0 standard drinks    Drug use: No    Sexual activity: Not on file   Other Topics Concern    Not on file   Social History Narrative    Not on file     Social Determinants of Health     Financial Resource Strain: Not on file   Food Insecurity: Not on file   Transportation Needs: Not on file   Physical Activity: Not on file   Stress: Not on file   Social Connections: Not on file   Intimate Partner Violence: Not on file   Housing Stability: Not on file       Review of Systems:   Constitutional: there has been no unanticipated weight loss. There's been no change in energy level, sleep pattern, or activity level. Eyes: No visual changes or diplopia. No scleral icterus. ENT: No Headaches, hearing loss or vertigo. No mouth sores or sore throat. Cardiovascular: Reviewed in HPI  Respiratory: No cough or wheezing, no sputum production. No hematemesis. Gastrointestinal: No abdominal pain, appetite loss, blood in stools. No change in bowel or bladder habits. Genitourinary: No dysuria, trouble voiding, or hematuria.   Musculoskeletal:  No gait disturbance, weakness or joint complaints. Integumentary: No rash or pruritis. Neurological: No headache, diplopia, change in muscle strength, numbness or tingling. No change in gait, balance, coordination, mood, affect, memory, mentation, behavior. Psychiatric: No anxiety, no depression. Endocrine: No malaise, fatigue or temperature intolerance. No excessive thirst, fluid intake, or urination. No tremor. Hematologic/Lymphatic: No abnormal bruising or bleeding, blood clots or swollen lymph nodes. Allergic/Immunologic: No nasal congestion or hives. Physical Examination:    Vitals:    08/23/22 1103   BP: 120/74   Pulse: 73   SpO2: 99%   Weight: 127 lb (57.6 kg)   Height: 5' 3\" (1.6 m)       Body mass index is 22.5 kg/m². Wt Readings from Last 3 Encounters:   08/23/22 127 lb (57.6 kg)   05/23/22 131 lb (59.4 kg)   02/22/22 132 lb (59.9 kg)     BP Readings from Last 3 Encounters:   08/23/22 120/74   05/23/22 132/77   02/22/22 112/64          Constitutional and General Appearance:   WD/WN in NAD  HEENT:  NC/AT  Skin:  Warm, dry  Respiratory:  Normal excursion and expansion without use of accessory muscles  Resp Auscultation: Normal breath sounds without dullness  Cardiovascular: The apical impulses not displaced  Heart tones are crisp and normal  Cervical veins are not engorged  The carotid upstroke is normal in amplitude and contour without delay or bruit  JVP normal  RRR with nl S1 and S2 without m,r,g  Peripheral pulses are symmetrical and full  There is no clubbing, cyanosis of the extremities.   No edema  Femoral Arteries: 2+ and equal  Pedal Pulses: 2+ and equal   Neck:  JVP less than 8 cm H2O  No thyromegaly  Abdomen:  No masses or tenderness  Liver/Spleen: No Abnormalities Noted  Neurological/Psychiatric:  Alert and oriented in all spheres  Moves all extremities well  Exhibits normal gait balance and coordination  No abnormalities of mood, affect, memory, mentation, or behavior are noted          CATH: 6/1/2016  PA 18/6 TZ-by echo RVSP 47-4/16  ~Findings  Normal pulmonary pressures and outputs  Normal coronary arteries   Recommendation  ~Aggressive medical treatment and risk factor modification      GXT Myoview-4/19/2016  Normal LV size and systolic function. Left ventricular ejection fraction of   70 %. Normal wall motion. There is a small anterolateral defect that is   present at rest and worse at stress concerning for ischemia. However cannot   rule out artifact from breast attenuation and increased bowel uptake of   radio tracer. Echo: 07/02/19  Normal left ventricle systolic function with an estimated ejection fraction   of 55%. No regional wall motion abnormalities are seen. Normal left ventricular diastolic filling pressure. Mild aortic regurgitation. Mild mitral and tricuspid regurgitation. Systolic pulmonary artery pressure (SPAP) is normal and estimated at 23 mmHg   (right atrial pressure 3 mmHg). Echo: 07/01/20  Summary   Left ventricular systolic function is normal with a visually estimated   ejection fraction of 55%. No regional wall motion abnormalities are noted. Normal left ventricular diastolic function. Mild mitral, tricuspid and aortic regurgitation. Systolic pulmonary artery pressure (SPAP) is normal and estimated at 28 mmHg   (right atrial pressure 3 mmHg). Lipids: 10/09/18: , TG 51, HDL 82, LDL 93    Cardiac monitor: 09/2020   SVT vs ST. Labs were reviewed including labs from other hospital systems through [facility]. Cardiac testing was reviewed including echos, nuclear scans, cardiac catheterization, including from other hospital systems through [facility]. ECHO 9/2021:    Summary   Normal left ventricle systolic function with an estimated ejection fraction   of 55%. No regional wall motion abnormalities are seen. Normal left ventricular diastolic filling pressure. Mild mitral, aortic and tricuspid regurgitation.    Systolic pulmonary artery pressure (SPAP) is normal and estimated at 22 mmHg   (right atrial pressure 3 mmHg). No change from last echo on 7/1/2020. Cardiac Monitor 4/25/2022-4/28/2022  SR, PAC 0.3%, symptoms with NSR, no significant arrhythmias. Labs were reviewed including labs from other hospital systems through [facility]. Cardiac testing was reviewed including echos, nuclear scans, cardiac catheterization, including from other hospital systems through [facility]. Assessment:  1. PAH (pulmonary artery hypertension) ([facility] [address]    2. SOB (shortness of breath)    3. SVT (supraventricular tachycardia) ([facility] [address]    4. Scleroderma ([facility] [address]        She does not appear to have any evidence of PAH. Continue surveillance    Patient's labs from 12/2021 show normal CBC, normal CMP, and high LDL(107). Plan:   1. I will call you later today with the results of your echocardiogram  2. Discussed the increased prednisone can cause your body to hold onto water  3. Continue current medications  4. Labs before next visit: BNP  5. Follow up with me in 6 months      This note was scribed in the presence of Eva Yu MD by Emerson Gerard RN    The scribe's documentation has been prepared under my direction and personally reviewed by me in its entirety. I confirm that the note above accurately reflects all work, treatment, procedures, and medical decision making performed by me. Time Based Itemization  A total of 40 minutes was spent on today's patient encounter. If applicable, non-patient-facing activities:  ( x)Preparing to see the patient and reviewing records  ( ) Individual interpretation of results  ( ) Discussion or coordination of care with other health care professionals  ( x) Ordering of unique tests, medications, or procedures  ( x) Documentation within the EHR      I appreciate the opportunity of cooperating in the care of this patient.     Meri Lam M.D., [facility]

## 2022-08-23 ENCOUNTER — HOSPITAL ENCOUNTER (OUTPATIENT)
Dept: CARDIOLOGY | Age: 52
Discharge: HOME | End: 2022-08-23
Payer: MEDICARE

## 2022-08-23 ENCOUNTER — OFFICE VISIT (OUTPATIENT)
Dept: CARDIOLOGY CLINIC | Age: 52
End: 2022-08-23
Payer: MEDICARE

## 2022-08-23 VITALS
DIASTOLIC BLOOD PRESSURE: 74 MMHG | WEIGHT: 127 LBS | HEART RATE: 73 BPM | HEIGHT: 63 IN | BODY MASS INDEX: 22.5 KG/M2 | OXYGEN SATURATION: 99 % | SYSTOLIC BLOOD PRESSURE: 120 MMHG

## 2022-08-23 DIAGNOSIS — M34.9 SCLERODERMA (HCC): ICD-10-CM

## 2022-08-23 DIAGNOSIS — I27.21 PAH (PULMONARY ARTERY HYPERTENSION) (HCC): ICD-10-CM

## 2022-08-23 DIAGNOSIS — R06.02 SOB (SHORTNESS OF BREATH): ICD-10-CM

## 2022-08-23 DIAGNOSIS — I27.21 PAH (PULMONARY ARTERY HYPERTENSION) (HCC): Primary | ICD-10-CM

## 2022-08-23 DIAGNOSIS — I47.1 SVT (SUPRAVENTRICULAR TACHYCARDIA) (HCC): ICD-10-CM

## 2022-08-23 LAB
LV EF: 66 %
LVEF MODALITY: NORMAL

## 2022-08-23 PROCEDURE — 93306 TTE W/DOPPLER COMPLETE: CPT

## 2022-08-23 PROCEDURE — G8420 CALC BMI NORM PARAMETERS: HCPCS | Performed by: INTERNAL MEDICINE

## 2022-08-23 PROCEDURE — 1036F TOBACCO NON-USER: CPT | Performed by: INTERNAL MEDICINE

## 2022-08-23 PROCEDURE — 3017F COLORECTAL CA SCREEN DOC REV: CPT | Performed by: INTERNAL MEDICINE

## 2022-08-23 PROCEDURE — G8427 DOCREV CUR MEDS BY ELIG CLIN: HCPCS | Performed by: INTERNAL MEDICINE

## 2022-08-23 PROCEDURE — 99215 OFFICE O/P EST HI 40 MIN: CPT | Performed by: INTERNAL MEDICINE

## 2022-08-23 RX ORDER — PREDNISONE 10 MG/1
TABLET ORAL
COMMUNITY
Start: 2022-08-04

## 2022-08-23 NOTE — PATIENT INSTRUCTIONS
Plan:   1. I will call you later today with the results of your echocardiogram  2. Discussed the increased prednisone can cause your body to hold onto water  3. Continue current medications  4. Labs before next visit: BNP  5.  Follow up with me in 6 months

## 2022-10-20 ENCOUNTER — HOSPITAL ENCOUNTER (OUTPATIENT)
Age: 52
Discharge: HOME | End: 2022-10-20
Payer: MEDICARE

## 2022-10-20 DIAGNOSIS — M34.9 SCLERODERMA (HCC): ICD-10-CM

## 2022-10-20 DIAGNOSIS — R06.02 SOB (SHORTNESS OF BREATH): ICD-10-CM

## 2022-10-20 DIAGNOSIS — I27.21 PAH (PULMONARY ARTERY HYPERTENSION) (HCC): ICD-10-CM

## 2022-10-20 PROCEDURE — 36415 COLL VENOUS BLD VENIPUNCTURE: CPT

## 2022-10-20 PROCEDURE — 83880 ASSAY OF NATRIURETIC PEPTIDE: CPT

## 2022-10-21 LAB — PRO-BNP: 61 PG/ML (ref 0–124)
